# Patient Record
Sex: MALE | Race: WHITE | NOT HISPANIC OR LATINO | Employment: FULL TIME | URBAN - NONMETROPOLITAN AREA
[De-identification: names, ages, dates, MRNs, and addresses within clinical notes are randomized per-mention and may not be internally consistent; named-entity substitution may affect disease eponyms.]

---

## 2018-01-09 ENCOUNTER — OFFICE VISIT (OUTPATIENT)
Dept: INTERNAL MEDICINE | Facility: CLINIC | Age: 48
End: 2018-01-09

## 2018-01-09 VITALS
SYSTOLIC BLOOD PRESSURE: 120 MMHG | WEIGHT: 195 LBS | TEMPERATURE: 98.5 F | BODY MASS INDEX: 27.3 KG/M2 | HEIGHT: 71 IN | HEART RATE: 61 BPM | DIASTOLIC BLOOD PRESSURE: 64 MMHG | OXYGEN SATURATION: 98 %

## 2018-01-09 DIAGNOSIS — L21.9 SEBORRHEIC DERMATITIS: ICD-10-CM

## 2018-01-09 DIAGNOSIS — L05.01 PILONIDAL CYST WITH ABSCESS: Primary | ICD-10-CM

## 2018-01-09 PROCEDURE — 99214 OFFICE O/P EST MOD 30 MIN: CPT | Performed by: PHYSICIAN ASSISTANT

## 2018-01-09 RX ORDER — KETOCONAZOLE 20 MG/ML
SHAMPOO TOPICAL 2 TIMES WEEKLY
Qty: 120 ML | Refills: 5 | Status: SHIPPED | OUTPATIENT
Start: 2018-01-11 | End: 2018-08-23

## 2018-01-09 RX ORDER — DOXYCYCLINE HYCLATE 100 MG/1
100 CAPSULE ORAL EVERY 12 HOURS SCHEDULED
Qty: 14 CAPSULE | Refills: 0 | Status: SHIPPED | OUTPATIENT
Start: 2018-01-09 | End: 2018-01-16

## 2018-01-09 NOTE — PROGRESS NOTES
Alberto Torres is a 47 y.o. male.     Subjective   History of Present Illness   History of pilonidal cyst with reconstruction several years ago and currently has concern for a few days of worsening pain and mild swelling in the same area. He reports the pain is not bothersome when he is standing but very bothersome with sitting which makes driving hard. HE has not taken anything for the pain. He denies any drainage from the site. He soaked in warm water yesterday which helped a little.     Also concerned with a red, flaky patch of skin on the right side of his face.         The following portions of the patient's history were reviewed and updated as appropriate: allergies, current medications, past family history, past medical history, past social history, past surgical history and problem list.    Review of Systems    Constitutional: Negative for appetite change, chills, fatigue, fever and unexpected weight change.   HENT: Negative for congestion, ear pain, hearing loss, nosebleeds, postnasal drip, rhinorrhea, sore throat, tinnitus and trouble swallowing.    Eyes: Negative for photophobia, discharge and visual disturbance.   Respiratory: Negative for cough, chest tightness, shortness of breath and wheezing.    Cardiovascular: Negative for chest pain, palpitations and leg swelling.   Gastrointestinal: Negative for abdominal distention, abdominal pain, blood in stool, constipation, diarrhea, nausea and vomiting.   Endocrine: Negative for cold intolerance, heat intolerance, polydipsia, polyphagia and polyuria.   Musculoskeletal: Negative for arthralgias, back pain, joint swelling, myalgias, neck pain and neck stiffness.   Skin: pain in gluteal cleft. rash and color change on face. Negative for pallor and wound.   Allergic/Immunologic: Negative for environmental allergies, food allergies and immunocompromised state.   Neurological: Negative for dizziness, tremors, seizures, weakness, numbness and headaches.  "  Hematological: Negative for adenopathy. Does not bruise/bleed easily.   Psychiatric/Behavioral: Negative for sleep disturbances, agitation, behavioral problems, confusion, hallucinations, self-injury and suicidal ideas. The patient is not nervous/anxious.      Objective    Physical Exam  Constitutional: Oriented to person, place, and time. Appears well-developed and well-nourished.   HENT:   Head: Normocephalic and atraumatic.   Eyes: EOM are normal. Pupils are equal, round, and reactive to light.   Neck: Normal range of motion. Neck supple.   Cardiovascular: Normal rate, regular rhythm and normal heart sounds.    Pulmonary/Chest: Effort normal and breath sounds normal. No respiratory distress.  Has no wheezes or rales. Exhibits no chest wall tenderness.   Abdominal: Soft. Bowel sounds are normal. Exhibits no distension and no mass. There is no tenderness.   Musculoskeletal: Normal range of motion. Exhibits no tenderness.   Neurological: Alert and oriented to person, place, and time.   Skin: Mild erythema of the right and left side of proximal gluteal cleft with tenderness, no appreciable mass or abscess. Mild shiny erythematous patch right pre-auricular with flaking. Skin is warm and dry.   Psychiatric: Has a normal mood and affect. Behavior is normal. Judgment and thought content normal.       /64  Pulse 61  Temp 98.5 °F (36.9 °C)  Ht 180.3 cm (70.98\")  Wt 88.5 kg (195 lb)  SpO2 98%  BMI 27.21 kg/m2    Nursing note and vitals reviewed.          Assessment/Plan   Alberto was seen today for cyst on back.    Diagnoses and all orders for this visit:    Pilonidal cyst with abscess  -     doxycycline (VIBRAMYCIN) 100 MG capsule; Take 1 capsule by mouth Every 12 (Twelve) Hours for 7 days.  Soak twice daily in warm water with Epsom salt until resolved.       Seborrheic dermatitis  -     ketoconazole (NIZORAL) 2 % shampoo; Apply  topically 2 (Two) Times a Week. Lather hair and leave on for 5 minutes. Rinse " thoroughly.      Call or RTC if symptoms worsen or persist.

## 2018-08-23 ENCOUNTER — OFFICE VISIT (OUTPATIENT)
Dept: INTERNAL MEDICINE | Facility: CLINIC | Age: 48
End: 2018-08-23

## 2018-08-23 VITALS
BODY MASS INDEX: 27.27 KG/M2 | HEIGHT: 71 IN | DIASTOLIC BLOOD PRESSURE: 86 MMHG | WEIGHT: 194.8 LBS | HEART RATE: 60 BPM | OXYGEN SATURATION: 99 % | TEMPERATURE: 98.4 F | SYSTOLIC BLOOD PRESSURE: 140 MMHG

## 2018-08-23 DIAGNOSIS — R61 NIGHT SWEAT: ICD-10-CM

## 2018-08-23 DIAGNOSIS — H57.13 EYE PAIN, BILATERAL: ICD-10-CM

## 2018-08-23 DIAGNOSIS — R51.9 ACUTE INTRACTABLE HEADACHE, UNSPECIFIED HEADACHE TYPE: Primary | ICD-10-CM

## 2018-08-23 DIAGNOSIS — M25.50 ARTHRALGIA, UNSPECIFIED JOINT: ICD-10-CM

## 2018-08-23 DIAGNOSIS — R53.83 FATIGUE, UNSPECIFIED TYPE: ICD-10-CM

## 2018-08-23 PROCEDURE — 99213 OFFICE O/P EST LOW 20 MIN: CPT | Performed by: PHYSICIAN ASSISTANT

## 2018-08-23 RX ORDER — DOXYCYCLINE HYCLATE 100 MG/1
100 TABLET, DELAYED RELEASE ORAL 2 TIMES DAILY
Qty: 20 TABLET | Refills: 0 | Status: SHIPPED | OUTPATIENT
Start: 2018-08-23 | End: 2018-09-02

## 2018-08-23 NOTE — PROGRESS NOTES
Alberto Torres is a 48 y.o. male.     Subjective   History of Present Illness   Here today with concern of acute headache behind the eyes and in the base of his head for the last 4 days, worsening since onset.  Has pain with moving his eyes and he feels his vision is a little fuzzy.  He has also had intermittent low-grade fever, night sweats, fatigue and arthralgias onset of headache. Tylenol and ibuprofen do not help at all.   He denies any rash, weakness, edema, chest pain, SOA, nausea or sore throat.  No known tick bite but he did find a small lone-star tick crawling on him a few days prior to onset of symptoms. Of note, his nephew was diagnosed with Lyme disease 3 weeks ago and they are often in the woods together.           The following portions of the patient's history were reviewed and updated as appropriate: allergies, current medications, past family history, past medical history, past social history, past surgical history and problem list.    Review of Systems    Constitutional: fatigue, fever. Negative for appetite change, chills and unexpected weight change.   HENT: Negative for congestion, ear pain, hearing loss, nosebleeds, postnasal drip, rhinorrhea, sore throat, tinnitus and trouble swallowing.    Eyes: visual disturbance. Negative for photophobia, discharge.  Respiratory: Negative for cough, chest tightness, shortness of breath and wheezing.    Cardiovascular: Negative for chest pain, palpitations and leg swelling.   Gastrointestinal: Negative for abdominal distention, abdominal pain, blood in stool, constipation, diarrhea, nausea and vomiting.   Endocrine: Negative for cold intolerance, heat intolerance, polydipsia, polyphagia and polyuria.   Musculoskeletal: arthralgias.  Negative for back pain, joint swelling, myalgias, neck pain and neck stiffness.   Skin: Negative for color change, pallor, rash and wound.   Allergic/Immunologic: Negative for environmental allergies, food allergies and  "immunocompromised state.   Neurological: headache. Negative for dizziness, tremors, seizures, weakness, numbness.  Hematological: Negative for adenopathy. Does not bruise/bleed easily.   Psychiatric/Behavioral: Negative for sleep disturbances, agitation, behavioral problems, confusion, hallucinations, self-injury and suicidal ideas. The patient is not nervous/anxious.      Objective    Physical Exam  Constitutional: appears fatigued. Appears well-developed and well-nourished.   HENT: OP normal. TMs normal. Nares normal.   Head: No sinus tenderness.  Normocephalic and atraumatic.   Eyes: EOM are normal. Pupils are equal, round, and reactive to light.   Neck: Normal range of motion. Neck supple.   Cardiovascular: Normal rate, regular rhythm and normal heart sounds.    Pulmonary/Chest: Effort normal and breath sounds normal. No respiratory distress.  Has no wheezes or rales. Exhibits no chest wall tenderness.   Abdominal: Soft. Bowel sounds are normal. Exhibits no distension and no mass. There is no tenderness.   Musculoskeletal: Normal range of motion. Exhibits no tenderness.   Neurological: CN II-XII intact. Alert and oriented to person, place, and time.   Skin: Skin is warm and dry.   Psychiatric: Has a normal mood and affect. Behavior is normal. Judgment and thought content normal.       /86   Pulse 60   Temp 98.4 °F (36.9 °C)   Ht 180.3 cm (70.98\")   Wt 88.4 kg (194 lb 12.8 oz)   SpO2 99%   BMI 27.18 kg/m²     Nursing note and vitals reviewed.        Assessment/Plan   Alberto was seen today for headache and neck pain.    Diagnoses and all orders for this visit:    Acute intractable headache, unspecified headache type  -     Lyme, Total Antibody Test / Reflex  -     CBC & Differential  -     Comprehensive Metabolic Panel  -     Sedimentation Rate  -     doxycycline (DORYX) 100 MG enteric coated tablet; Take 1 tablet by mouth 2 (Two) Times a Day for 10 days.    Eye pain, bilateral  -     Lyme, Total " Antibody Test / Reflex  -     CBC & Differential  -     Comprehensive Metabolic Panel  -     Sedimentation Rate    Arthralgia, unspecified joint  -     Lyme, Total Antibody Test / Reflex  -     CBC & Differential  -     Comprehensive Metabolic Panel  -     Sedimentation Rate  -     doxycycline (DORYX) 100 MG enteric coated tablet; Take 1 tablet by mouth 2 (Two) Times a Day for 10 days.    Fatigue, unspecified type  -     Lyme, Total Antibody Test / Reflex  -     CBC & Differential  -     Comprehensive Metabolic Panel  -     Sedimentation Rate  -     doxycycline (DORYX) 100 MG enteric coated tablet; Take 1 tablet by mouth 2 (Two) Times a Day for 10 days.    Night sweat  -     Lyme, Total Antibody Test / Reflex  -     CBC & Differential  -     Comprehensive Metabolic Panel  -     Sedimentation Rate  -     doxycycline (DORYX) 100 MG enteric coated tablet; Take 1 tablet by mouth 2 (Two) Times a Day for 10 days.        Treat for suspected Lyme disease.

## 2018-08-24 LAB
ALBUMIN SERPL-MCNC: 4.2 G/DL (ref 3.5–5)
ALBUMIN/GLOB SERPL: 1.6 G/DL (ref 1–2)
ALP SERPL-CCNC: 62 U/L (ref 38–126)
ALT SERPL-CCNC: 54 U/L (ref 13–69)
AST SERPL-CCNC: 68 U/L (ref 15–46)
B BURGDOR IGG+IGM SER-ACNC: <0.91 ISR (ref 0–0.9)
BASOPHILS # BLD MANUAL: 0.05 10*3/MM3 (ref 0–0.2)
BASOPHILS NFR BLD MANUAL: 1 % (ref 0–2.5)
BILIRUB SERPL-MCNC: 1 MG/DL (ref 0.2–1.3)
BUN SERPL-MCNC: 15 MG/DL (ref 7–20)
BUN/CREAT SERPL: 12.5 (ref 6.3–21.9)
CALCIUM SERPL-MCNC: 9.5 MG/DL (ref 8.4–10.2)
CHLORIDE SERPL-SCNC: 103 MMOL/L (ref 98–107)
CO2 SERPL-SCNC: 28 MMOL/L (ref 26–30)
CREAT SERPL-MCNC: 1.2 MG/DL (ref 0.6–1.3)
DIFFERENTIAL COMMENT: NORMAL
EOSINOPHIL # BLD MANUAL: 0.18 10*3/MM3 (ref 0–0.7)
EOSINOPHIL NFR BLD MANUAL: 4 % (ref 0–7)
ERYTHROCYTE [DISTWIDTH] IN BLOOD BY AUTOMATED COUNT: 12 % (ref 11.5–14.5)
ERYTHROCYTE [SEDIMENTATION RATE] IN BLOOD BY WESTERGREN METHOD: 1 MM/HR (ref 0–15)
GLOBULIN SER CALC-MCNC: 2.7 GM/DL
GLUCOSE SERPL-MCNC: 91 MG/DL (ref 74–98)
HCT VFR BLD AUTO: 47 % (ref 42–52)
HGB BLD-MCNC: 15.9 G/DL (ref 14–18)
LYMPHOCYTES # BLD MANUAL: 1.38 10*3/MM3 (ref 0.6–3.4)
LYMPHOCYTES NFR BLD MANUAL: 30 % (ref 10–50)
MCH RBC QN AUTO: 32.1 PG (ref 27–31)
MCHC RBC AUTO-ENTMCNC: 33.8 G/DL (ref 30–37)
MCV RBC AUTO: 94.8 FL (ref 80–94)
MONOCYTES # BLD MANUAL: 0.28 10*3/MM3 (ref 0–0.9)
MONOCYTES NFR BLD MANUAL: 6 % (ref 0–12)
NEUTROPHILS # BLD MANUAL: 2.21 10*3/MM3 (ref 2–6.9)
NEUTROPHILS NFR BLD MANUAL: 40 % (ref 37–80)
PLATELET # BLD AUTO: 145 10*3/MM3 (ref 130–400)
PLATELET BLD QL SMEAR: NORMAL
POTASSIUM SERPL-SCNC: 4.7 MMOL/L (ref 3.5–5.1)
PROT SERPL-MCNC: 6.9 G/DL (ref 6.3–8.2)
RBC # BLD AUTO: 4.96 10*6/MM3 (ref 4.7–6.1)
RBC MORPH BLD: NORMAL
SODIUM SERPL-SCNC: 138 MMOL/L (ref 137–145)
WBC # BLD AUTO: 4.6 10*3/MM3 (ref 4.8–10.8)

## 2019-01-04 ENCOUNTER — OFFICE VISIT (OUTPATIENT)
Dept: INTERNAL MEDICINE | Facility: CLINIC | Age: 49
End: 2019-01-04

## 2019-01-04 VITALS
OXYGEN SATURATION: 97 % | DIASTOLIC BLOOD PRESSURE: 83 MMHG | SYSTOLIC BLOOD PRESSURE: 146 MMHG | BODY MASS INDEX: 26.32 KG/M2 | HEIGHT: 71 IN | TEMPERATURE: 98.1 F | WEIGHT: 188 LBS | HEART RATE: 59 BPM

## 2019-01-04 DIAGNOSIS — J06.9 ACUTE URI: Primary | ICD-10-CM

## 2019-01-04 PROCEDURE — 99213 OFFICE O/P EST LOW 20 MIN: CPT | Performed by: PHYSICIAN ASSISTANT

## 2019-01-04 RX ORDER — AZITHROMYCIN 250 MG/1
TABLET, FILM COATED ORAL
Qty: 6 TABLET | Refills: 0 | Status: SHIPPED | OUTPATIENT
Start: 2019-01-04 | End: 2021-09-11 | Stop reason: HOSPADM

## 2019-01-04 NOTE — PROGRESS NOTES
Subjective     Chief Complaint: cough, sinus pressure    History of Present Illness     Alberto Torres is a 48 y.o. male presenting with complaints of fatigue, body aches, headache, sinus pressure, ear pain, cough, congestion x over 2 weeks. Not improving with dayquil, nyquil, mucinex, zyrtec. Felt feverish last night. Denies sick contacts. Denies CP, SOA, wheezing, nausea, vomiting, diarrhea.    The following portions of the patient's history were reviewed and updated as appropriate: current medications, allergies, PMH.    Review of Systems   Constitutional: Positive for fatigue. Negative for appetite change, chills, fever and unexpected weight change.   HENT: Positive for congestion, ear pain, sinus pressure and sore throat. Negative for hearing loss, nosebleeds, tinnitus and trouble swallowing.    Eyes: Negative for photophobia, discharge and visual disturbance.   Respiratory: Positive for cough. Negative for chest tightness, shortness of breath and wheezing.    Cardiovascular: Negative for chest pain, palpitations and leg swelling.   Gastrointestinal: Negative for abdominal distention, abdominal pain, blood in stool, constipation, diarrhea, nausea and vomiting.   Endocrine: Negative for cold intolerance, heat intolerance, polydipsia, polyphagia and polyuria.   Genitourinary: Negative for difficulty urinating, dysuria, flank pain, frequency, hematuria and urgency.   Musculoskeletal: Negative for arthralgias, back pain, joint swelling, myalgias, neck pain and neck stiffness.   Skin: Negative for color change, pallor, rash and wound.   Allergic/Immunologic: Negative for environmental allergies, food allergies and immunocompromised state.   Neurological: Positive for headaches. Negative for dizziness, weakness and numbness.   Hematological: Negative for adenopathy. Does not bruise/bleed easily.   Psychiatric/Behavioral: Negative for dysphoric mood, hallucinations, sleep disturbance and suicidal ideas. The patient  "is not nervous/anxious.        Objective     Vitals:    01/04/19 1347   BP: 146/83   Pulse: 59   Temp: 98.1 °F (36.7 °C)   SpO2: 97%   Weight: 85.3 kg (188 lb)   Height: 180.3 cm (70.98\")       Physical Exam   Constitutional: Appears well-developed and well-nourished.   Ears: TMs dull bilaterally  Nose: Nose normal.   Mouth/Throat: OP erythema, edema  Eyes: EOM are normal. Pupils are equal, round, and reactive to light.   Neck: Normal range of motion. Neck supple.   Cardiovascular: Normal rate, regular rhythm and normal heart sounds.    Pulmonary/Chest: Effort normal and breath sounds normal.   Lymphadenopathy: Mild cervical adenopathy noted.   Neurological: Alert and oriented to person, place, and time.   Skin: Skin is warm and dry.   Psychiatric: Exhibits a normal mood and affect.     Assessment/Plan     Diagnoses and all orders for this visit:    Acute URI  -     azithromycin (ZITHROMAX) 250 MG tablet; Take 2 tablets the first day, then 1 tablet daily for the remaining 4 days.    Increase water intake, tylenol and/or motrin as needed, mucinex DM    Mi Duval PA-C  01/04/2019         Please note that portions of this note were completed with a voice recognition program. Efforts were made to edit dictation, but occasionally words are mistranscribed.  "

## 2021-08-31 ENCOUNTER — HOSPITAL ENCOUNTER (INPATIENT)
Facility: HOSPITAL | Age: 51
LOS: 11 days | Discharge: HOME | End: 2021-09-11
Attending: EMERGENCY MEDICINE | Admitting: EMERGENCY MEDICINE

## 2021-08-31 ENCOUNTER — APPOINTMENT (OUTPATIENT)
Dept: CT IMAGING | Facility: HOSPITAL | Age: 51
End: 2021-08-31

## 2021-08-31 ENCOUNTER — APPOINTMENT (OUTPATIENT)
Dept: GENERAL RADIOLOGY | Facility: HOSPITAL | Age: 51
End: 2021-08-31

## 2021-08-31 DIAGNOSIS — J12.82 PNEUMONIA DUE TO COVID-19 VIRUS: ICD-10-CM

## 2021-08-31 DIAGNOSIS — U07.1 COVID-19: Primary | ICD-10-CM

## 2021-08-31 DIAGNOSIS — J96.01 ACUTE RESPIRATORY FAILURE WITH HYPOXIA (HCC): ICD-10-CM

## 2021-08-31 DIAGNOSIS — U07.1 PNEUMONIA DUE TO COVID-19 VIRUS: ICD-10-CM

## 2021-08-31 LAB
ALBUMIN SERPL-MCNC: 3.7 G/DL (ref 3.5–5.2)
ALBUMIN/GLOB SERPL: 1.2 G/DL
ALP SERPL-CCNC: 72 U/L (ref 39–117)
ALT SERPL W P-5'-P-CCNC: 54 U/L (ref 1–41)
ANION GAP SERPL CALCULATED.3IONS-SCNC: 14.8 MMOL/L (ref 5–15)
AST SERPL-CCNC: 100 U/L (ref 1–40)
BASOPHILS # BLD AUTO: 0.01 10*3/MM3 (ref 0–0.2)
BASOPHILS NFR BLD AUTO: 0.1 % (ref 0–1.5)
BILIRUB SERPL-MCNC: 0.5 MG/DL (ref 0–1.2)
BUN SERPL-MCNC: 19 MG/DL (ref 6–20)
BUN/CREAT SERPL: 16.7 (ref 7–25)
CALCIUM SPEC-SCNC: 9 MG/DL (ref 8.6–10.5)
CHLORIDE SERPL-SCNC: 100 MMOL/L (ref 98–107)
CO2 SERPL-SCNC: 25.2 MMOL/L (ref 22–29)
CREAT SERPL-MCNC: 1.14 MG/DL (ref 0.76–1.27)
CRP SERPL-MCNC: 4.01 MG/DL (ref 0–0.5)
D DIMER PPP FEU-MCNC: 1.63 MCGFEU/ML (ref 0–0.57)
D-LACTATE SERPL-SCNC: 1.6 MMOL/L (ref 0.5–2)
D-LACTATE SERPL-SCNC: 2.9 MMOL/L (ref 0.5–2)
DEPRECATED RDW RBC AUTO: 40.8 FL (ref 37–54)
EOSINOPHIL # BLD AUTO: 0 10*3/MM3 (ref 0–0.4)
EOSINOPHIL NFR BLD AUTO: 0 % (ref 0.3–6.2)
ERYTHROCYTE [DISTWIDTH] IN BLOOD BY AUTOMATED COUNT: 11.9 % (ref 12.3–15.4)
GFR SERPL CREATININE-BSD FRML MDRD: 68 ML/MIN/1.73
GLOBULIN UR ELPH-MCNC: 3.1 GM/DL
GLUCOSE SERPL-MCNC: 133 MG/DL (ref 65–99)
HCT VFR BLD AUTO: 46.3 % (ref 37.5–51)
HGB BLD-MCNC: 16 G/DL (ref 13–17.7)
HOLD SPECIMEN: NORMAL
HOLD SPECIMEN: NORMAL
IMM GRANULOCYTES # BLD AUTO: 0.06 10*3/MM3 (ref 0–0.05)
IMM GRANULOCYTES NFR BLD AUTO: 0.7 % (ref 0–0.5)
LYMPHOCYTES # BLD AUTO: 0.76 10*3/MM3 (ref 0.7–3.1)
LYMPHOCYTES NFR BLD AUTO: 8.6 % (ref 19.6–45.3)
MCH RBC QN AUTO: 32.1 PG (ref 26.6–33)
MCHC RBC AUTO-ENTMCNC: 34.6 G/DL (ref 31.5–35.7)
MCV RBC AUTO: 93 FL (ref 79–97)
MONOCYTES # BLD AUTO: 0.72 10*3/MM3 (ref 0.1–0.9)
MONOCYTES NFR BLD AUTO: 8.2 % (ref 5–12)
NEUTROPHILS NFR BLD AUTO: 7.28 10*3/MM3 (ref 1.7–7)
NEUTROPHILS NFR BLD AUTO: 82.4 % (ref 42.7–76)
NRBC BLD AUTO-RTO: 0 /100 WBC (ref 0–0.2)
NT-PROBNP SERPL-MCNC: 223.2 PG/ML (ref 0–900)
PLATELET # BLD AUTO: 268 10*3/MM3 (ref 140–450)
PMV BLD AUTO: 11 FL (ref 6–12)
POTASSIUM SERPL-SCNC: 4.4 MMOL/L (ref 3.5–5.2)
PROCALCITONIN SERPL-MCNC: 0.11 NG/ML (ref 0–0.25)
PROT SERPL-MCNC: 6.8 G/DL (ref 6–8.5)
RBC # BLD AUTO: 4.98 10*6/MM3 (ref 4.14–5.8)
SODIUM SERPL-SCNC: 140 MMOL/L (ref 136–145)
TROPONIN T SERPL-MCNC: <0.01 NG/ML (ref 0–0.03)
WBC # BLD AUTO: 8.83 10*3/MM3 (ref 3.4–10.8)
WHOLE BLOOD HOLD SPECIMEN: NORMAL
WHOLE BLOOD HOLD SPECIMEN: NORMAL

## 2021-08-31 PROCEDURE — 99222 1ST HOSP IP/OBS MODERATE 55: CPT | Performed by: STUDENT IN AN ORGANIZED HEALTH CARE EDUCATION/TRAINING PROGRAM

## 2021-08-31 PROCEDURE — 71275 CT ANGIOGRAPHY CHEST: CPT

## 2021-08-31 PROCEDURE — 94799 UNLISTED PULMONARY SVC/PX: CPT

## 2021-08-31 PROCEDURE — 80053 COMPREHEN METABOLIC PANEL: CPT

## 2021-08-31 PROCEDURE — 84145 PROCALCITONIN (PCT): CPT | Performed by: EMERGENCY MEDICINE

## 2021-08-31 PROCEDURE — 84484 ASSAY OF TROPONIN QUANT: CPT

## 2021-08-31 PROCEDURE — 99284 EMERGENCY DEPT VISIT MOD MDM: CPT

## 2021-08-31 PROCEDURE — 93005 ELECTROCARDIOGRAM TRACING: CPT

## 2021-08-31 PROCEDURE — 87040 BLOOD CULTURE FOR BACTERIA: CPT | Performed by: EMERGENCY MEDICINE

## 2021-08-31 PROCEDURE — 83880 ASSAY OF NATRIURETIC PEPTIDE: CPT

## 2021-08-31 PROCEDURE — 25010000002 AZITHROMYCIN 500 MG/250 ML: Performed by: EMERGENCY MEDICINE

## 2021-08-31 PROCEDURE — 83605 ASSAY OF LACTIC ACID: CPT | Performed by: EMERGENCY MEDICINE

## 2021-08-31 PROCEDURE — 25010000002 IOPAMIDOL 61 % SOLUTION: Performed by: FAMILY MEDICINE

## 2021-08-31 PROCEDURE — 85025 COMPLETE CBC W/AUTO DIFF WBC: CPT

## 2021-08-31 PROCEDURE — 71045 X-RAY EXAM CHEST 1 VIEW: CPT

## 2021-08-31 PROCEDURE — 25010000002 DEXAMETHASONE SODIUM PHOSPHATE 10 MG/ML SOLUTION: Performed by: EMERGENCY MEDICINE

## 2021-08-31 PROCEDURE — 86140 C-REACTIVE PROTEIN: CPT | Performed by: EMERGENCY MEDICINE

## 2021-08-31 PROCEDURE — 25010000002 ENOXAPARIN PER 10 MG: Performed by: STUDENT IN AN ORGANIZED HEALTH CARE EDUCATION/TRAINING PROGRAM

## 2021-08-31 PROCEDURE — 85379 FIBRIN DEGRADATION QUANT: CPT | Performed by: EMERGENCY MEDICINE

## 2021-08-31 RX ORDER — SODIUM CHLORIDE 0.9 % (FLUSH) 0.9 %
10 SYRINGE (ML) INJECTION EVERY 12 HOURS SCHEDULED
Status: DISCONTINUED | OUTPATIENT
Start: 2021-08-31 | End: 2021-09-11 | Stop reason: HOSPADM

## 2021-08-31 RX ORDER — SODIUM CHLORIDE 0.9 % (FLUSH) 0.9 %
10 SYRINGE (ML) INJECTION AS NEEDED
Status: DISCONTINUED | OUTPATIENT
Start: 2021-08-31 | End: 2021-09-11 | Stop reason: HOSPADM

## 2021-08-31 RX ORDER — ACETAMINOPHEN 160 MG/5ML
650 SOLUTION ORAL EVERY 4 HOURS PRN
Status: DISCONTINUED | OUTPATIENT
Start: 2021-08-31 | End: 2021-09-11 | Stop reason: HOSPADM

## 2021-08-31 RX ORDER — DEXAMETHASONE SODIUM PHOSPHATE 4 MG/ML
6 INJECTION, SOLUTION INTRA-ARTICULAR; INTRALESIONAL; INTRAMUSCULAR; INTRAVENOUS; SOFT TISSUE DAILY
Status: CANCELLED | OUTPATIENT
Start: 2021-08-31 | End: 2021-09-10

## 2021-08-31 RX ORDER — DEXAMETHASONE SODIUM PHOSPHATE 10 MG/ML
10 INJECTION INTRAMUSCULAR; INTRAVENOUS EVERY 12 HOURS
Status: DISCONTINUED | OUTPATIENT
Start: 2021-08-31 | End: 2021-09-06

## 2021-08-31 RX ORDER — ACETAMINOPHEN 325 MG/1
650 TABLET ORAL EVERY 4 HOURS PRN
Status: DISCONTINUED | OUTPATIENT
Start: 2021-08-31 | End: 2021-09-11 | Stop reason: HOSPADM

## 2021-08-31 RX ORDER — ONDANSETRON 2 MG/ML
4 INJECTION INTRAMUSCULAR; INTRAVENOUS EVERY 6 HOURS PRN
Status: DISCONTINUED | OUTPATIENT
Start: 2021-08-31 | End: 2021-09-11 | Stop reason: HOSPADM

## 2021-08-31 RX ORDER — ALBUTEROL SULFATE 90 UG/1
6 AEROSOL, METERED RESPIRATORY (INHALATION) ONCE
Status: COMPLETED | OUTPATIENT
Start: 2021-08-31 | End: 2021-08-31

## 2021-08-31 RX ORDER — DEXAMETHASONE SODIUM PHOSPHATE 10 MG/ML
10 INJECTION, SOLUTION INTRAMUSCULAR; INTRAVENOUS ONCE
Status: COMPLETED | OUTPATIENT
Start: 2021-08-31 | End: 2021-08-31

## 2021-08-31 RX ORDER — ALBUTEROL SULFATE 90 UG/1
2 AEROSOL, METERED RESPIRATORY (INHALATION) EVERY 6 HOURS PRN
Status: DISCONTINUED | OUTPATIENT
Start: 2021-08-31 | End: 2021-09-11 | Stop reason: HOSPADM

## 2021-08-31 RX ORDER — ACETAMINOPHEN 650 MG/1
650 SUPPOSITORY RECTAL EVERY 4 HOURS PRN
Status: DISCONTINUED | OUTPATIENT
Start: 2021-08-31 | End: 2021-09-11 | Stop reason: HOSPADM

## 2021-08-31 RX ORDER — GUAIFENESIN/DEXTROMETHORPHAN 100-10MG/5
15 SYRUP ORAL EVERY 4 HOURS PRN
Status: DISCONTINUED | OUTPATIENT
Start: 2021-08-31 | End: 2021-09-11 | Stop reason: HOSPADM

## 2021-08-31 RX ADMIN — ENOXAPARIN SODIUM 40 MG: 40 INJECTION SUBCUTANEOUS at 19:40

## 2021-08-31 RX ADMIN — SODIUM CHLORIDE, PRESERVATIVE FREE 10 ML: 5 INJECTION INTRAVENOUS at 19:40

## 2021-08-31 RX ADMIN — IOPAMIDOL 100 ML: 612 INJECTION, SOLUTION INTRAVENOUS at 21:45

## 2021-08-31 RX ADMIN — DEXAMETHASONE SODIUM PHOSPHATE 10 MG: 10 INJECTION, SOLUTION INTRAMUSCULAR; INTRAVENOUS at 13:41

## 2021-08-31 RX ADMIN — SODIUM CHLORIDE 1000 ML: 9 INJECTION, SOLUTION INTRAVENOUS at 15:18

## 2021-08-31 RX ADMIN — ALBUTEROL SULFATE 6 PUFF: 90 AEROSOL, METERED RESPIRATORY (INHALATION) at 13:42

## 2021-08-31 RX ADMIN — AZITHROMYCIN 500 MG: 500 INJECTION, POWDER, LYOPHILIZED, FOR SOLUTION INTRAVENOUS at 14:40

## 2021-09-01 LAB
ANION GAP SERPL CALCULATED.3IONS-SCNC: 10.6 MMOL/L (ref 5–15)
BUN SERPL-MCNC: 18 MG/DL (ref 6–20)
BUN/CREAT SERPL: 22.8 (ref 7–25)
CALCIUM SPEC-SCNC: 8.3 MG/DL (ref 8.6–10.5)
CHLORIDE SERPL-SCNC: 103 MMOL/L (ref 98–107)
CO2 SERPL-SCNC: 23.4 MMOL/L (ref 22–29)
CREAT SERPL-MCNC: 0.79 MG/DL (ref 0.76–1.27)
DACRYOCYTES BLD QL SMEAR: NORMAL
DEPRECATED RDW RBC AUTO: 40.6 FL (ref 37–54)
ERYTHROCYTE [DISTWIDTH] IN BLOOD BY AUTOMATED COUNT: 11.9 % (ref 12.3–15.4)
GFR SERPL CREATININE-BSD FRML MDRD: 104 ML/MIN/1.73
GLUCOSE SERPL-MCNC: 149 MG/DL (ref 65–99)
HCT VFR BLD AUTO: 43 % (ref 37.5–51)
HGB BLD-MCNC: 14.8 G/DL (ref 13–17.7)
LYMPHOCYTES # BLD MANUAL: 1.62 10*3/MM3 (ref 0.7–3.1)
LYMPHOCYTES NFR BLD MANUAL: 18 % (ref 19.6–45.3)
LYMPHOCYTES NFR BLD MANUAL: 7 % (ref 5–12)
MCH RBC QN AUTO: 32.1 PG (ref 26.6–33)
MCHC RBC AUTO-ENTMCNC: 34.4 G/DL (ref 31.5–35.7)
MCV RBC AUTO: 93.3 FL (ref 79–97)
METAMYELOCYTES NFR BLD MANUAL: 1 % (ref 0–0)
MONOCYTES # BLD AUTO: 0.52 10*3/MM3 (ref 0.1–0.9)
NEUTROPHILS # BLD AUTO: 5.15 10*3/MM3 (ref 1.7–7)
NEUTROPHILS NFR BLD MANUAL: 67 % (ref 42.7–76)
NEUTS BAND NFR BLD MANUAL: 3 % (ref 0–5)
OVALOCYTES BLD QL SMEAR: NORMAL
PLATELET # BLD AUTO: 278 10*3/MM3 (ref 140–450)
PMV BLD AUTO: 11.2 FL (ref 6–12)
POTASSIUM SERPL-SCNC: 4.5 MMOL/L (ref 3.5–5.2)
PROCALCITONIN SERPL-MCNC: 0.1 NG/ML (ref 0–0.25)
RBC # BLD AUTO: 4.61 10*6/MM3 (ref 4.14–5.8)
RBC MORPH BLD: NORMAL
SCAN SLIDE: NORMAL
SMALL PLATELETS BLD QL SMEAR: ADEQUATE
SMALL PLATELETS BLD QL SMEAR: ADEQUATE
SODIUM SERPL-SCNC: 137 MMOL/L (ref 136–145)
VARIANT LYMPHS NFR BLD MANUAL: 4 % (ref 0–5)
WBC # BLD AUTO: 7.36 10*3/MM3 (ref 3.4–10.8)
WBC MORPH BLD: NORMAL

## 2021-09-01 PROCEDURE — 25010000002 ENOXAPARIN PER 10 MG: Performed by: STUDENT IN AN ORGANIZED HEALTH CARE EDUCATION/TRAINING PROGRAM

## 2021-09-01 PROCEDURE — 80048 BASIC METABOLIC PNL TOTAL CA: CPT | Performed by: STUDENT IN AN ORGANIZED HEALTH CARE EDUCATION/TRAINING PROGRAM

## 2021-09-01 PROCEDURE — 99231 SBSQ HOSP IP/OBS SF/LOW 25: CPT | Performed by: STUDENT IN AN ORGANIZED HEALTH CARE EDUCATION/TRAINING PROGRAM

## 2021-09-01 PROCEDURE — 94799 UNLISTED PULMONARY SVC/PX: CPT

## 2021-09-01 PROCEDURE — 25010000002 DEXAMETHASONE PER 1 MG: Performed by: FAMILY MEDICINE

## 2021-09-01 PROCEDURE — 85025 COMPLETE CBC W/AUTO DIFF WBC: CPT | Performed by: STUDENT IN AN ORGANIZED HEALTH CARE EDUCATION/TRAINING PROGRAM

## 2021-09-01 PROCEDURE — 84145 PROCALCITONIN (PCT): CPT | Performed by: STUDENT IN AN ORGANIZED HEALTH CARE EDUCATION/TRAINING PROGRAM

## 2021-09-01 RX ORDER — PROMETHAZINE HYDROCHLORIDE 6.25 MG/5ML
6.25 SYRUP ORAL 3 TIMES DAILY
COMMUNITY
End: 2021-09-16

## 2021-09-01 RX ORDER — AMINO ACIDS/PROTEIN HYDROLYS 15G-100/30
30 LIQUID (ML) ORAL 2 TIMES DAILY
Status: DISCONTINUED | OUTPATIENT
Start: 2021-09-01 | End: 2021-09-11 | Stop reason: HOSPADM

## 2021-09-01 RX ORDER — DEXAMETHASONE 0.5 MG/1
2 TABLET ORAL 2 TIMES DAILY WITH MEALS
COMMUNITY
End: 2021-09-11 | Stop reason: HOSPADM

## 2021-09-01 RX ADMIN — SODIUM CHLORIDE, PRESERVATIVE FREE 10 ML: 5 INJECTION INTRAVENOUS at 02:45

## 2021-09-01 RX ADMIN — SODIUM CHLORIDE, PRESERVATIVE FREE 10 ML: 5 INJECTION INTRAVENOUS at 10:20

## 2021-09-01 RX ADMIN — Medication 30 ML: at 10:19

## 2021-09-01 RX ADMIN — DEXAMETHASONE SODIUM PHOSPHATE 10 MG: 10 INJECTION INTRAMUSCULAR; INTRAVENOUS at 14:52

## 2021-09-01 RX ADMIN — SODIUM CHLORIDE, PRESERVATIVE FREE 10 ML: 5 INJECTION INTRAVENOUS at 21:41

## 2021-09-01 RX ADMIN — DEXAMETHASONE SODIUM PHOSPHATE 10 MG: 10 INJECTION INTRAMUSCULAR; INTRAVENOUS at 02:45

## 2021-09-01 RX ADMIN — Medication 30 ML: at 21:41

## 2021-09-01 RX ADMIN — ENOXAPARIN SODIUM 40 MG: 40 INJECTION SUBCUTANEOUS at 21:41

## 2021-09-01 NOTE — PLAN OF CARE
Goal Outcome Evaluation:  Plan of Care Reviewed With: patient        Progress: no change  Outcome Summary: pleasant patient with no complaint of pain at this time-medications given per Dr. Lyles's orders-patient very SOA with Aivo high flow nasal cannula 70%/30L-monitor labs and continue to monitor patient-encouragement given to use IS often and stay out of the bed as much as possible-encouraged to stay in bedside recliner as much as possible and move around in the room as much as possible-will continue to monitor patient's activity

## 2021-09-01 NOTE — PAYOR COMM NOTE
"TO:BC  FROM: YVONNE CRUMP, RN PHONE [phone]  IN CLINICALS REF# [id number]    Negro De La Fuente (51 y.o. Male)     Date of Birth Social Security Number Address Home Phone MRN    1970  [address] [place] [address] [phone] [id number]    Ep[employer]op[employer] Marital Status          None        Admission Date Admission Type Admitting Provider Attending Provider Department, Room/Bed    21 Emergency Hue Villalobos DO Clayton, Taylor C, DO [facility] MED SURG  3, /    Discharge Date Discharge Disposition Discharge Destination                       Attending Provider: Hue Villalobos DO    Allergies: No Known Allergies    Isolation: Enh Drop/Con   Infection: COVID (confirmed) (21)   Code Status: CPR    Ht: 185.4 cm (73\")   Wt: 81.9 kg (180 lb 8.9 oz)    Admission Cmt: None   Principal Problem: None                Active Insurance as of 2021     Primary Coverage     Payor Plan Insurance Group Employer/Plan Group    [employer]HE[employer] BLUE CROSS AN[employer]M BLUE CROSS BLUE SHIELD PPO [id number]     Payor Plan Address Payor Plan Phone Number Payor Plan Fax Number Effective Dates    [phone]  2008 - None Entered    [place] [address]       Subscriber Name Subscriber Birth Date Member ID       NEGRO DE LA FUENTE 1970 [id number]                 Emergency Contacts      (Rel.) Home Phone Work Phone Mobile Phone    AMERICA SANDOVAL (Sister) [phone] -- --               History & Physical      Hue Villalobos DO at 21 1548              [facility] HOSPITALIST   HISTORY AND PHYSICAL      Name:  Negro De La Fuente   Age:  51 y.o.  Sex:  male  :  1970  MRN:  [id number]   Visit Number:  [id number]  Admission Date:  2021  Date Of Service:  21  Primary Care Physician:  Yajaira Saleh APRN    Chief Complaint:     Shortness of breath    History Of Presenting Illness:      Mr. De La Fuente is a " "51-year-old male with no significant past medical history that presents to the hospital with complaints of worsening shortness of breath. He states that beginning 11 days ago, he initially felt very fatigued with some shortness of breath. This worsened over the next few days, prompting him to be tested for COVID on 8/23/21 at which time he tested positive. He was told to stay home, rest, and hydrate as much as possible. He continued to have worsening shortness of breath, dysgeusia, anosmia, and developed a headache which prompted him to be seen at an outside hospital where he was told he had \"double pneumonia.\" He was given Azithromycin and discharged home. He has been feeling progressively more dyspneic, with intermittent coughing, today nauseous with vomiting, prompting him to be seen in the ED. He is not vaccinated.    Upon arrival to the ED, patient was found to be hypoxic with an oxygen saturation of 80% on 10L nasal cannula. This then improved to 98% with placing the patient on 30L high flow nasal cannula, 100% FiO2. He was given Albuterol as well as Decadron in the ED and subsequently admitted to the hospital for further evaluation and treatment.    Review Of Systems:    All systems were reviewed and negative except as mentioned in history of presenting illness, assessment and plan.    Past Medical History: Patient  has no past medical history on file.    Past Surgical History: Patient  has no past surgical history on file.    Social History: Patient  reports that he has never smoked. He does not have any smokeless tobacco history on file. He reports current alcohol use.    Family History: Patient's family history is not on file.    Allergies:      Patient has no known allergies.    Home Medications:    Prior to Admission Medications     Prescriptions Last Dose Informant Patient Reported? Taking?    azithromycin (ZITHROMAX) 250 MG tablet   No No    Take 2 tablets the first day, then 1 tablet daily for the " remaining 4 days.    ibuprofen (ADVIL,MOTRIN) 200 MG tablet   Yes No    Take 200 mg by mouth every 6 (six) hours as needed for mild pain (1-3).    mupirocin (BACTROBAN) 2 % ointment   Yes No    Apply 1 application topically to the appropriate area as directed As Needed.    Omega-3 Fatty Acids (FISH OIL OMEGA-3 PO)   Yes No    Take  by mouth.        ED Medications:    Medications   sodium chloride 0.9 % flush 10 mL (has no administration in time range)   dexamethasone sodium phosphate injection 10 mg (10 mg Intravenous Given 8/31/21 1341)   albuterol sulfate HFA (PROVENTIL HFA;VENTOLIN HFA;PROAIR HFA) inhaler 6 puff (6 puffs Inhalation Given 8/31/21 1342)   AZITHROMYCIN 500 MG/250 ML 0.9% NS IVPB (vial-mate) (500 mg Intravenous New Bag 8/31/21 1440)   sodium chloride 0.9 % bolus 1,000 mL (1,000 mL Intravenous New Bag 8/31/21 1518)     Vital Signs:  Temp:  [99.4 °F (37.4 °C)] 99.4 °F (37.4 °C)  Heart Rate:  [60-94] 60  Resp:  [20-26] 20  BP: (110-124)/(57-73) 124/73        08/31/21  1255   Weight: 85.3 kg (188 lb)     Body mass index is 24.8 kg/m².    Physical Exam:     General Appearance:  Alert and cooperative. Tearful.   Head:  Atraumatic and normocephalic.   Eyes: Conjunctivae and sclerae normal, no icterus. No pallor.   Ears:  Ears with no abnormalities noted.   Throat: No oral lesions, no thrush, oral mucosa moist.   Neck: Supple, trachea midline, no thyromegaly.   Back:   No kyphoscoliosis present. No tenderness to palpation.   Lungs:   Breath sounds heard bilaterally equally.  No crackles or wheezing. No Pleural rub or bronchial breathing.   Heart:  Normal S1 and S2, no murmur, no gallop, no rub. No JVD.   Abdomen:   Normal bowel sounds, no masses, no organomegaly. Soft, nontender, nondistended, no rebound tenderness.   Extremities: Supple, no edema, no cyanosis, no clubbing.   Pulses: Pulses palpable bilaterally.   Skin: No bleeding or rash.   Neurologic: Alert and oriented x 3. No facial asymmetry. Moves all  four limbs. No tremors.      Laboratory data:    I have reviewed the labs done in the emergency room.    Results from last 7 days   Lab Units 08/31/21  1310   SODIUM mmol/L 140   POTASSIUM mmol/L 4.4   CHLORIDE mmol/L 100   CO2 mmol/L 25.2   BUN mg/dL 19   CREATININE mg/dL 1.14   CALCIUM mg/dL 9.0   BILIRUBIN mg/dL 0.5   ALK PHOS U/L 72   ALT (SGPT) U/L 54*   AST (SGOT) U/L 100*   GLUCOSE mg/dL 133*     Results from last 7 days   Lab Units 08/31/21  1310   WBC 10*3/mm3 8.83   HEMOGLOBIN g/dL 16.0   HEMATOCRIT % 46.3   PLATELETS 10*3/mm3 268         Results from last 7 days   Lab Units 08/31/21  1310   TROPONIN T ng/mL <0.010     Results from last 7 days   Lab Units 08/31/21  1310   PROBNP pg/mL 223.2                       Invalid input(s): USDES,  BLOODU, NITRITITE, BACT, EP    Pain Management Panel    There is no flowsheet data to display.         EKG:      Normal sinus rhythm at a rate of 69 with a QTc of 459.    Radiology:    XR Chest 1 View    Result Date: 8/31/2021  PROCEDURE: XR CHEST 1 VW-  HISTORY: SOA Triage Protocol  COMPARISON:  None  FINDINGS:  Portable view of the chest demonstrates multifocal airspace opacities, right greater than left suspicious for pneumonia, likely viral. There is no evidence of effusion, pneumothorax or other significant pleural disease. The mediastinum is unremarkable.  The heart size is normal.      Findings suspicious for pneumonia, likely viral  This report was finalized on 8/31/2021 2:03 PM by Amol Tan MD.      Assessment:    1. Acute Hypoxic Respiratory Failure 2/2 Bilateral COVID-19 Pneumonia, POA  2. Severe, Bilateral COVID-19 Pneumonia, POA  3. Elevated D-dimer  4. Lactic Acidosis    Plan:    - Patient requiring high flow nasal cannula at 30L with FiO2 100% to maintain oxygen saturation >90%  - Will increase Dexamethasone to 10mg bid  - Unfortunately given the length of time of patient's current symptoms of 10 days, he is outside of the window for Remdesivir  "administration and we do not have Actemra at our facility due to nationwide shortage.  - Given elevated D-dimer of 1.63, will proceed with CT chest PE protocol to rule out PE  - Procal negative at this time, with normal WBC. Will hold off on initiation of antibiotics as I have low suspicion for superimposed bacterial pneumonia.  - Albuterol inhaler  - Continuous pulse oximetry  - PT/OT  - Remains high risk for decompensation, requiring intubation.    Risk Assessment: Patient is high risk given hypoxic respiratory failure secondary to COVID-19 pneumonia. High risk for decompensation requiring intubation.   DVT Prophylaxis: Lovenox  Code Status: full  Diet: Regular    Advance Care Planning   ACP discussion was held with the patient during this visit. Patient does not have an advance directive, declines further assistance.      Hue Villalobos DO  08/31/21  15:48 EDT    Dictated utilizing Dragon dictation.    Electronically signed by Hue Villalobos DO at 08/31/21 1744          Emergency Department Notes      Jairo Aguirre MD at 08/31/21 1320          Subjective   51-year-old male presenting with shortness of breath.  He was diagnosed with Covid recently, has been feeling bad for about 10 days, increasing shortness of breath, some cough and fever.  Was seen 3 days ago at outside hospital and had CT scan that showed \"double pneumonia\".  He denies any vomiting or diarrhea.  He is otherwise healthy, takes no medications, does not smoke, runs several times a week.  Is not vaccinated.          Review of Systems    History reviewed. No pertinent past medical history.    No Known Allergies    History reviewed. No pertinent surgical history.    History reviewed. No pertinent family history.    Social History     Socioeconomic History   • Marital status:      Spouse name: Not on file   • Number of children: Not on file   • Years of education: Not on file   • Highest education level: Not on file   Tobacco " Use   • Smoking status: Never Smoker   Substance and Sexual Activity   • Alcohol use: Yes     Comment: OCCASIONALLY           Objective   Physical Exam  Vitals reviewed.   Constitutional:       Appearance: Normal appearance. He is not ill-appearing, toxic-appearing or diaphoretic.      Comments: Distressed   HENT:      Head: Normocephalic and atraumatic.      Right Ear: External ear normal.      Left Ear: External ear normal.      Nose: Nose normal.      Mouth/Throat:      Mouth: Mucous membranes are moist.      Pharynx: Oropharynx is clear.   Eyes:      Extraocular Movements: Extraocular movements intact.      Conjunctiva/sclera: Conjunctivae normal.      Pupils: Pupils are equal, round, and reactive to light.   Cardiovascular:      Rate and Rhythm: Normal rate and regular rhythm.      Pulses: Normal pulses.      Heart sounds: Normal heart sounds.   Pulmonary:      Comments: Increased work of breathing, coarse breath sounds throughout  Abdominal:      General: Bowel sounds are normal. There is no distension.      Tenderness: There is no abdominal tenderness.   Musculoskeletal:         General: No swelling, tenderness or deformity. Normal range of motion.      Cervical back: Normal range of motion and neck supple.   Skin:     General: Skin is warm and dry.      Capillary Refill: Capillary refill takes less than 2 seconds.      Findings: No rash.   Neurological:      General: No focal deficit present.      Mental Status: He is alert and oriented to person, place, and time.   Psychiatric:         Mood and Affect: Mood normal.         Behavior: Behavior normal.         Procedures          ED Course                                           MDM  Number of Diagnoses or Management Options  Acute respiratory failure with hypoxia (CMS/HCC)  COVID-19  Pneumonia due to COVID-19 virus  Diagnosis management comments: 51-year-old male with shortness of breath in the setting of COVID-19.  Well-developed, well-nourished man who is  distressed, tachypneic.  He is profoundly hypoxic.  Will obtain labs, EKG and chest x-ray.  Sound like he is already had a CT scan recently so will not repeat D-dimer or CT scan.  Will give albuterol, Decadron.  We will start him on high flow nasal cannula.  Disposition is to the hospital.    DDx: COVID-19, pneumonia    EKG interpreted by me: Sinus rhythm, normal rate, borderline QT, right axis, no acute ST/T wave changes, this is an abnormal EKG    Blood work notable for mild lactic acidosis.  Chest x-ray reveals bilateral opacities consistent with Covid pneumonia.  He feels much improved now on high flow nasal cannula.  Discussed with Dr. Pink, he requested a D-dimer.  Will admit for further treatment and monitoring.  Patient is agreeable with plan of care.    30 minutes of critical care provided. This time excludes other billable procedures. Time does include preparation of documents, medical consultations, review of old records, and direct bedside care. Patient was at high risk for life-threatening deterioration due to hypoxic respiratory failure, COVID-19, Covid pneumonia.       Critical Care  Total time providing critical care: 30-74 minutes      Final diagnoses:   COVID-19   Acute respiratory failure with hypoxia (CMS/HCC)   Pneumonia due to COVID-19 virus          Jairo Aguirre MD  08/31/21 1445      Electronically signed by Jairo Aguirre MD at 08/31/21 1445       Vital Signs (last day)     Date/Time   Temp   Temp src   Pulse   Resp   BP   Patient Position   SpO2    09/01/21 1100   97.3 (36.3)   Oral   (!) 44   20   135/86   Lying   (!) 88    09/01/21 0700   97.9 (36.6)   Oral   53   20   111/66   Lying   (!) 87    09/01/21 0507   --   --   --   --   --   --   91    09/01/21 0427   97.6 (36.4)   Axillary   (!) 43   20   130/80   Lying   (!) 84    09/01/21 0026   96.7 (35.9)   Axillary   (!) 39   20   134/79   Lying   98    08/31/21 2040   --   Axillary   50   20   124/70   Lying   98     08/31/21 1700   --   --   (!) 49   20   136/80   Lying   98    08/31/21 15:17:31   --   --   60   20   124/73   Sitting   96    08/31/21 1500   --   --   67   --   --   --   96    08/31/21 1430   --   --   64   --   --   --   95    08/31/21 1341   --   --   94   22   --   --   98    08/31/21 1300   --   --   --   --   --   --   (!) 88    08/31/21 1255   99.4 (37.4)   Oral   83   26   110/57   Sitting   (!) 80                Current Facility-Administered Medications   Medication Dose Route Frequency Provider Last Rate Last Admin   • acetaminophen (TYLENOL) tablet 650 mg  650 mg Oral Q4H PRN Hue Villalobos DO        Or   • acetaminophen (TYLENOL) 160 MG/5ML solution 650 mg  650 mg Oral Q4H PRN Hue Villalobos DO        Or   • acetaminophen (TYLENOL) suppository 650 mg  650 mg Rectal Q4H PRN Hue Villalobos DO       • albuterol sulfate HFA (PROVENTIL HFA;VENTOLIN HFA;PROAIR HFA) inhaler 2 puff  2 puff Inhalation Q6H PRN Hue Villalobos DO       • dexamethasone (DECADRON) injection 10 mg  10 mg Intravenous Q12H Selina Pink, DO   10 mg at 09/01/21 0245   • enoxaparin (LOVENOX) syringe 40 mg  40 mg Subcutaneous Q24H Hue Villalobos DO   40 mg at 08/31/21 1940   • guaiFENesin-dextromethorphan (ROBITUSSIN DM) 100-10 MG/5ML syrup 15 mL  15 mL Oral Q4H PRN Hue Villalobos, DO       • ondansetron (ZOFRAN) injection 4 mg  4 mg Intravenous Q6H PRN Hue Villalobos DO       • PRO-STAT oral liquid 30 mL  30 mL Oral BID Hue Villalobos DO   30 mL at 09/01/21 1019   • sodium chloride 0.9 % flush 10 mL  10 mL Intravenous PRN Emergency, Triage Protocol, MD   10 mL at 08/31/21 1940   • sodium chloride 0.9 % flush 10 mL  10 mL Intravenous Q12H Hue Villalobos DO   10 mL at 09/01/21 1020   • sodium chloride 0.9 % flush 10 mL  10 mL Intravenous PRN Hue Villalobos, DO   10 mL at 09/01/21 0245       Lab Results (last 24 hours)     Procedure Component Value Units Date/Time    Manual Differential  "[[id number]]  (Abnormal) Collected: 09/01/21 0624    Specimen: Blood Updated: 09/01/21 0837     Neutrophil % 67.0 %      Lymphocyte % 18.0 %      Monocyte % 7.0 %      Bands %  3.0 %      Metamyelocyte % 1.0 %      Atypical Lymphocyte % 4.0 %      Neutrophils Absolute 5.15 10*3/mm3      Lymphocytes Absolute 1.62 10*3/mm3      Monocytes Absolute 0.52 10*3/mm3      RBC Morphology Normal     WBC Morphology Normal     Platelet Estimate Adequate    CBC Auto Differential [[id number]]  (Abnormal) Collected: 09/01/21 0624    Specimen: Blood Updated: 09/01/21 0837     WBC 7.36 10*3/mm3      RBC 4.61 10*6/mm3      Hemoglobin 14.8 g/dL      Hematocrit 43.0 %      MCV 93.3 fL      MCH 32.1 pg      MCHC 34.4 g/dL      RDW 11.9 %      RDW-SD 40.6 fl      MPV 11.2 fL      Platelets 278 10*3/mm3     Scan Slide [[id number]] Collected: 09/01/21 0624    Specimen: Blood Updated: 09/01/21 0837     Dacrocytes Slight/1+     Ovalocytes Slight/1+     Platelet Estimate Adequate     Scan Slide --     Comment: See Manual Differential Results       Procalcitonin [[id number]]  (Normal) Collected: 09/01/21 0624    Specimen: Blood Updated: 09/01/21 0711     Procalcitonin 0.10 ng/mL     Narrative:      As a Marker for Sepsis (Non-Neonates):     1. <0.5 ng/mL represents a low risk of severe sepsis and/or septic shock.  2. >2 ng/mL represents a high risk of severe sepsis and/or septic shock.    As a Marker for Lower Respiratory Tract Infections that require antibiotic therapy:  PCT on Admission     Antibiotic Therapy             6-12 Hrs later  >0.5                          Strongly Recommended            >0.25 - <0.5             Recommended  0.1 - 0.25                  Discouraged                       Remeasure/reassess PCT  <0.1                         Strongly Discouraged         Remeasure/reassess PCT      As 28 day mortality risk marker: \"Change in Procalcitonin Result\" (>80% or <=80%) if Day 0 (or Day 1) and Day 4 values are available. Refer to " http://www.[facility]-pct-calculator.com/    Change in PCT <=80 %   A decrease of PCT levels below or equal to 80% defines a positive change in PCT test result representing a higher risk for 28-day all-cause mortality of patients diagnosed with severe sepsis or septic shock.    Change in PCT >80 %   A decrease of PCT levels of more than 80% defines a negative change in PCT result representing a lower risk for 28-day all-cause mortality of patients diagnosed with severe sepsis or septic shock.              Results may be falsely decreased if patient taking Biotin.     Basic Metabolic Panel [[id number]]  (Abnormal) Collected: 09/01/21 0624    Specimen: Blood Updated: 09/01/21 0703     Glucose 149 mg/dL      BUN 18 mg/dL      Creatinine 0.79 mg/dL      Sodium 137 mmol/L      Potassium 4.5 mmol/L      Chloride 103 mmol/L      CO2 23.4 mmol/L      Calcium 8.3 mg/dL      eGFR Non African Amer 104 mL/min/1.73      BUN/Creatinine Ratio 22.8     Anion Gap 10.6 mmol/L     Narrative:      GFR Normal >60  Chronic Kidney Disease <60  Kidney Failure <15      STAT Lactic Acid, Reflex [[id number]]  (Normal) Collected: 08/31/21 1830    Specimen: Blood Updated: 08/31/21 1856     Lactate 1.6 mmol/L         Imaging Results (Last 24 Hours)     Procedure Component Value Units Date/Time    CT Chest Pulmonary Embolism [[id number]] Collected: 08/31/21 2240     Updated: 08/31/21 2241    Narrative:      FINAL REPORT    TECHNIQUE:  Thin section axial images were obtained through the chest and  during the arterial phase of IV contrast administration. Coronal  3-D MIP reconstructed images were also provided.    CLINICAL HISTORY:  PE suspected, high prob, covid +    FINDINGS:  The pulmonary arteries are well-opacified and there is no  filling defect to indicate pulmonary embolism. The thoracic  aorta is normal. There are multifocal bilateral patchy areas of  groundglass/airspace opacity consistent with pneumonia,  including viral pneumonia. There is no  pleural disease,  adenopathy or significant osseous abnormality.      Impression:      Viral pneumonia. No pulmonary embolism.    Authenticated by Reinaldo Bautista M.D. on 08/31/2021 10:40:07 PM        Physician Progress Notes (last 24 hours) (Notes from 08/31/21 1206 through 09/01/21 1206)    No notes of this type exist for this encounter.

## 2021-09-01 NOTE — PLAN OF CARE
Goal Outcome Evaluation:  Plan of Care Reviewed With: patient        Progress: improving  Outcome Summary: PT continues on HF nasal canula. Pt transfered to chair with less reported SOA.

## 2021-09-01 NOTE — CASE MANAGEMENT/SOCIAL WORK
Discharge Planning Assessment   [facility]     Patient Name: Alberto Torres  MRN: [id number]  Today's Date: 9/1/2021    Admit Date: 8/31/2021    Discharge Needs Assessment     Row Name 09/01/21 2944       Living Environment    Lives With  significant other    Name(s) of Who Lives With Patient  Girfrienester Khalil    Current Living Arrangements  home/apartment/condo    Potentially Unsafe Housing Conditions  unable to assess    Primary Care Provided by  self    Provides Primary Care For  no one    Caregiving Concerns  Pt currently  receiving high flow o2 at 35 liters    Family Caregiver if Needed  none    Quality of Family Relationships  supportive    Able to Return to Prior Arrangements  yes    Living Arrangement Comments  Lives in a one story house with his girlfriend Thania Khalil.  Has steps and was having difficulty with them due to his SOA.       Resource/Environmental Concerns    Resource/Environmental Concerns  none    Transportation Concerns  car, none       Transition Planning    Patient/Family Anticipates Transition to  home with family    Patient/Family Anticipated Services at Transition  none    Transportation Anticipated  family or friend will provide       Discharge Needs Assessment    Readmission Within the Last 30 Days  no previous admission in last 30 days    Concerns to be Addressed  discharge planning Will likely need o2 at discharge, chooses [facility]    Equipment Needed After Discharge  oxygen    Provided Post Acute Provider List?  Yes    Post Acute Provider List  DME Supplier Verbal List    Provided Post Acute Provider Quality & Resource List?  N/A    Delivered To  Patient    Method of Delivery  Telephone    Patient's Choice of Community Agency(s)  [facility]        Discharge Plan     Row Name 09/01/21 4090       Plan    Plan Called to pt's room via his cell phone.  Was able to speak with him.  Has no POA or Living Will.  Lives with his girlfriend Lilly Khalil. Address and phone no is  correct.  States she is sick too but not as sick as he is.  His sister Isabel Alva is his Emergency Contact. Lives a couple of miles from him.  Spoke also to Isabel on the phone but CM had already call pt to discuss DCP. .  Lives in a house, does have steps and has been difficult for him since he is SOA.  Independent with [employer] has no medical equipment at home.  Currently pt is on 35 liters o2 and will likely need it at discharge.  Chooses [facility].  Does plan to go home and will have transportation. Denies further DC needs at this time.  Cm will continue to follow.         Continued Care and Services - Admitted Since 8/31/2021    Coordination has not been started for this encounter.         Demographic Summary     Row Name 09/01/21 1251       General Information    Admission Type  inpatient    Arrived From  emergency department    Expected Length of Stay (LOS)  5 days    Referral Source  admission list    Reason for Consult  discharge planning     Used During This Interaction  no        Functional Status     Row Name 09/01/21 1252       Functional Status    Usual Activity Tolerance  moderate    Current Activity Tolerance  poor    Functional Status Comments  Independent       Functional Status, IADL    Medications  independent    Meal Preparation  assistive person    Housekeeping  assistive person    Laundry  assistive person    Shopping  assistive person    IADL Comments  self       Mental Status Summary    Recent Changes in Mental Status/Cognitive Functioning  no changes    Mental Status Comments  Alert, oriented and pleasant           Radha Christie RN

## 2021-09-01 NOTE — PROGRESS NOTES
"    [facility]    Name:  Alberto Torres   Age:  51 y.o.  Sex:  male  :  1970  MRN:  [id number]   Visit Number:  [id number]  Admission Date:  2021  Date Of Service:  21  Primary Care Physician:  Yajaira Saleh APRN     LOS: 1 day :    Chief Complaint:      Shortness of breath, COVID-19 Pneumonia    Subjective:    Patient seen and examined at bedside this morning.  Chart reviewed and events noted.  He states that he is doing \"okay.\"  He was using his incentive spirometer when I entered the room.  He states that he is trying to use this as much as possible and is trying to sit in the bedside chair as much as possible.  He does continue to require 30 L high flow nasal cannula to maintain an oxygen saturation of greater than 90%. Denies chest pain or palpitations.    Hospital Course:    Mr. Torres is a 51-year-old male with no significant past medical history that presents to the hospital with complaints of worsening shortness of breath. Was diagnosed with COVID on 21. Continued to have worsening shortness of breath and upon arrival to the ED was found to be hypoxic with oxygen saturation of 80% on 10L. He was placed on 30L high flow nasal cannula, 100% FiO2 which improved oxygen saturation to 98%. He was then admitted to the hospital and started on Dexamethasone 10mg bid.    Review of Systems:     All systems were reviewed and negative except as mentioned in subjective, assessment and plan.    Vital Signs:    Temp:  [96.7 °F (35.9 °C)-97.9 °F (36.6 °C)] 97.3 °F (36.3 °C)  Heart Rate:  [39-53] 44  Resp:  [20] 20  BP: (111-136)/(66-86) 135/86    Intake and output:    I/O last 3 completed shifts:  In: 490 [P.O.:240; IV Piggyback:250]  Out: 500 [Urine:500]  I/O this shift:  In: 480 [P.O.:480]  Out: -     Physical Examination:    General Appearance:  Alert and cooperative. Resting comfortably in bed. In NAD.   Head:  Atraumatic and normocephalic.    Eyes: " Conjunctivae and sclerae normal, no icterus. No pallor.   Throat: No oral lesions, no thrush, oral mucosa moist.   Neck: Supple, trachea midline, no thyromegaly.   Lungs:   Breath sounds diffusely diminished bilaterally with scattered ronchi throughout. No wheezing.   Heart:  Normal S1 and S2, no murmur, no gallop, no rub. No JVD.   Abdomen:   Normal bowel sounds, no masses, no organomegaly. Soft, nontender, nondistended, no rebound tenderness.   Extremities: Supple, no edema, no cyanosis, no clubbing.   Skin: No bleeding or rash.   Neurologic: Alert and oriented x 3. No facial asymmetry. Moves all four limbs. No tremors.      Laboratory results:    Results from last 7 days   Lab Units 09/01/21  0624 08/31/21  1310   SODIUM mmol/L 137 140   POTASSIUM mmol/L 4.5 4.4   CHLORIDE mmol/L 103 100   CO2 mmol/L 23.4 25.2   BUN mg/dL 18 19   CREATININE mg/dL 0.79 1.14   CALCIUM mg/dL 8.3* 9.0   BILIRUBIN mg/dL  --  0.5   ALK PHOS U/L  --  72   ALT (SGPT) U/L  --  54*   AST (SGOT) U/L  --  100*   GLUCOSE mg/dL 149* 133*     Results from last 7 days   Lab Units 09/01/21  0624 08/31/21  1310   WBC 10*3/mm3 7.36 8.83   HEMOGLOBIN g/dL 14.8 16.0   HEMATOCRIT % 43.0 46.3   PLATELETS 10*3/mm3 278 268         Results from last 7 days   Lab Units 08/31/21  1310   TROPONIN T ng/mL <0.010     Results from last 7 days   Lab Units 08/31/21  1333   BLOODCX  No growth at 24 hours  No growth at 24 hours         I have reviewed the patient's laboratory results.    Radiology results:    XR Chest 1 View    Result Date: 8/31/2021  PROCEDURE: XR CHEST 1 VW-  HISTORY: SOA Triage Protocol  COMPARISON:  None  FINDINGS:  Portable view of the chest demonstrates multifocal airspace opacities, right greater than left suspicious for pneumonia, likely viral. There is no evidence of effusion, pneumothorax or other significant pleural disease. The mediastinum is unremarkable.  The heart size is normal.      Impression: Findings suspicious for pneumonia,  likely viral  This report was finalized on 8/31/2021 2:03 PM by Amol Tan MD.    CT Chest Pulmonary Embolism    Result Date: 8/31/2021  FINAL REPORT TECHNIQUE: Thin section axial images were obtained through the chest and during the arterial phase of IV contrast administration. Coronal 3-D MIP reconstructed images were also provided. CLINICAL HISTORY: PE suspected, high prob, covid + FINDINGS: The pulmonary arteries are well-opacified and there is no filling defect to indicate pulmonary embolism. The thoracic aorta is normal. There are multifocal bilateral patchy areas of groundglass/airspace opacity consistent with pneumonia, including viral pneumonia. There is no pleural disease, adenopathy or significant osseous abnormality.     Impression: Viral pneumonia. No pulmonary embolism. Authenticated by Reinaldo Bautista M.D. on 08/31/2021 10:40:07 PM    I have reviewed the patient's radiology reports.    Medication Review:     I have reviewed the patient's active and prn medications.     Problem List:      COVID-19      Assessment:    1. Acute Hypoxic Respiratory Failure 2/2 Bilateral COVID-19 Pneumonia, POA  2. Severe, Bilateral COVID-19 Pneumonia, POA  3. Elevated D-dimer  4. Lactic Acidosis, Improved    Plan:    - Patient continues to require high flow nasal cannula at 35L, 90% FiO2 to maintain an oxygen saturation of greater than 90%.   - Continue Dexamethasone 10mg bid  - Patient is outside of the window for Remdesivir administration  - CT chest revealed no PE  - Procalcitonin negative. I have a low suspicion for superimposed bacterial pneumonia and therefore will hold off on antibiotics.  - Albuterol inhaler  - Continuous pulse oximetry  - PT/OT  - Incentive spirometry  - Continues to remain high risk for decompensation, requiring intubation. Patient is agreeable if needed.    DVT Prophylaxis: Lovenox  Code Status: Full  Diet: Regular  Discharge Plan: To be determined. Likely greater than 72 hours.    Hue CAT  DO Wilfredo  09/01/21  15:44 EDT    Dictated utilizing Dragon dictation.

## 2021-09-02 LAB
ANION GAP SERPL CALCULATED.3IONS-SCNC: 8.4 MMOL/L (ref 5–15)
BUN SERPL-MCNC: 20 MG/DL (ref 6–20)
BUN/CREAT SERPL: 26.7 (ref 7–25)
CALCIUM SPEC-SCNC: 8.7 MG/DL (ref 8.6–10.5)
CHLORIDE SERPL-SCNC: 103 MMOL/L (ref 98–107)
CO2 SERPL-SCNC: 24.6 MMOL/L (ref 22–29)
CREAT SERPL-MCNC: 0.75 MG/DL (ref 0.76–1.27)
DEPRECATED RDW RBC AUTO: 39.8 FL (ref 37–54)
ERYTHROCYTE [DISTWIDTH] IN BLOOD BY AUTOMATED COUNT: 11.7 % (ref 12.3–15.4)
GFR SERPL CREATININE-BSD FRML MDRD: 110 ML/MIN/1.73
GLUCOSE SERPL-MCNC: 144 MG/DL (ref 65–99)
HCT VFR BLD AUTO: 40.7 % (ref 37.5–51)
HGB BLD-MCNC: 14.3 G/DL (ref 13–17.7)
LYMPHOCYTES # BLD MANUAL: 1.68 10*3/MM3 (ref 0.7–3.1)
LYMPHOCYTES NFR BLD MANUAL: 11 % (ref 19.6–45.3)
LYMPHOCYTES NFR BLD MANUAL: 4 % (ref 5–12)
MCH RBC QN AUTO: 32.4 PG (ref 26.6–33)
MCHC RBC AUTO-ENTMCNC: 35.1 G/DL (ref 31.5–35.7)
MCV RBC AUTO: 92.3 FL (ref 79–97)
METAMYELOCYTES NFR BLD MANUAL: 1 % (ref 0–0)
MONOCYTES # BLD AUTO: 0.37 10*3/MM3 (ref 0.1–0.9)
NEUTROPHILS # BLD AUTO: 7.18 10*3/MM3 (ref 1.7–7)
NEUTROPHILS NFR BLD MANUAL: 77 % (ref 42.7–76)
PLATELET # BLD AUTO: 327 10*3/MM3 (ref 140–450)
PMV BLD AUTO: 11.3 FL (ref 6–12)
POTASSIUM SERPL-SCNC: 4.7 MMOL/L (ref 3.5–5.2)
RBC # BLD AUTO: 4.41 10*6/MM3 (ref 4.14–5.8)
RBC MORPH BLD: NORMAL
SCAN SLIDE: NORMAL
SMALL PLATELETS BLD QL SMEAR: ADEQUATE
SODIUM SERPL-SCNC: 136 MMOL/L (ref 136–145)
VARIANT LYMPHS NFR BLD MANUAL: 7 % (ref 0–5)
WBC # BLD AUTO: 9.32 10*3/MM3 (ref 3.4–10.8)
WBC MORPH BLD: NORMAL

## 2021-09-02 PROCEDURE — 94799 UNLISTED PULMONARY SVC/PX: CPT

## 2021-09-02 PROCEDURE — 85007 BL SMEAR W/DIFF WBC COUNT: CPT | Performed by: STUDENT IN AN ORGANIZED HEALTH CARE EDUCATION/TRAINING PROGRAM

## 2021-09-02 PROCEDURE — 80048 BASIC METABOLIC PNL TOTAL CA: CPT | Performed by: STUDENT IN AN ORGANIZED HEALTH CARE EDUCATION/TRAINING PROGRAM

## 2021-09-02 PROCEDURE — 99231 SBSQ HOSP IP/OBS SF/LOW 25: CPT | Performed by: STUDENT IN AN ORGANIZED HEALTH CARE EDUCATION/TRAINING PROGRAM

## 2021-09-02 PROCEDURE — 25010000002 ENOXAPARIN PER 10 MG: Performed by: STUDENT IN AN ORGANIZED HEALTH CARE EDUCATION/TRAINING PROGRAM

## 2021-09-02 PROCEDURE — 85025 COMPLETE CBC W/AUTO DIFF WBC: CPT | Performed by: STUDENT IN AN ORGANIZED HEALTH CARE EDUCATION/TRAINING PROGRAM

## 2021-09-02 PROCEDURE — 25010000002 DEXAMETHASONE PER 1 MG: Performed by: FAMILY MEDICINE

## 2021-09-02 RX ADMIN — SODIUM CHLORIDE, PRESERVATIVE FREE 10 ML: 5 INJECTION INTRAVENOUS at 20:37

## 2021-09-02 RX ADMIN — DEXAMETHASONE SODIUM PHOSPHATE 10 MG: 10 INJECTION INTRAMUSCULAR; INTRAVENOUS at 02:29

## 2021-09-02 RX ADMIN — SODIUM CHLORIDE, PRESERVATIVE FREE 10 ML: 5 INJECTION INTRAVENOUS at 14:20

## 2021-09-02 RX ADMIN — DEXAMETHASONE SODIUM PHOSPHATE 10 MG: 10 INJECTION INTRAMUSCULAR; INTRAVENOUS at 14:19

## 2021-09-02 RX ADMIN — SODIUM CHLORIDE, PRESERVATIVE FREE 10 ML: 5 INJECTION INTRAVENOUS at 08:56

## 2021-09-02 RX ADMIN — Medication 30 ML: at 20:37

## 2021-09-02 RX ADMIN — Medication 30 ML: at 11:53

## 2021-09-02 RX ADMIN — ENOXAPARIN SODIUM 40 MG: 40 INJECTION SUBCUTANEOUS at 20:36

## 2021-09-02 NOTE — PROGRESS NOTES
"    [facility]    Name:  Alberto Torres   Age:  51 y.o.  Sex:  male  :  1970  MRN:  [id number]   Visit Number:  [id number]  Admission Date:  2021  Date Of Service:  21  Primary Care Physician:  Yajaira Saleh APRN     LOS: 2 days :    Chief Complaint:      Shortness of breath, COVID-19 Pneumonia    Subjective:    Patient seen and examined at bedside this morning.  Chart reviewed and events noted.  He states that he \"feels a lot better today.\"  He was sitting in the bedside chair, using incentive spirometer.  He now requires 50 L high flow nasal cannula with 80% FiO2 to maintain an oxygen saturation of greater than 90%. Denies chest pain or palpitations.    Hospital Course:    Mr. Torres is a 51-year-old male with no significant past medical history that presents to the hospital with complaints of worsening shortness of breath. Was diagnosed with COVID on 21. Continued to have worsening shortness of breath and upon arrival to the ED was found to be hypoxic with oxygen saturation of 80% on 10L. He was placed on 30L high flow nasal cannula, 100% FiO2 which improved oxygen saturation to 98%. He was then admitted to the hospital and started on Dexamethasone 10mg bid.    Review of Systems:     All systems were reviewed and negative except as mentioned in subjective, assessment and plan.    Vital Signs:    Temp:  [97 °F (36.1 °C)-97.7 °F (36.5 °C)] 97 °F (36.1 °C)  Heart Rate:  [40-67] 67  Resp:  [18-20] 18  BP: (115-151)/(68-87) 117/74    Intake and output:    I/O last 3 completed shifts:  In: 960 [P.O.:960]  Out: 1200 [Urine:1200]  I/O this shift:  In: -   Out: 500 [Urine:500]    Physical Examination:    General Appearance:  Alert and cooperative. Sitting in bedside chair. In NAD.   Head:  Atraumatic and normocephalic.    Eyes: Conjunctivae and sclerae normal, no icterus. No pallor.   Throat: No oral lesions, no thrush, oral mucosa moist.   Neck: " Supple, trachea midline, no thyromegaly.   Lungs:   Breath sounds diffusely diminished bilaterally with scattered ronchi throughout. No wheezing.   Heart:  Normal S1 and S2, no murmur, no gallop, no rub. No JVD.   Abdomen:   Normal bowel sounds, no masses, no organomegaly. Soft, nontender, nondistended, no rebound tenderness.   Extremities: Supple, no edema, no cyanosis, no clubbing.   Skin: No bleeding or rash.   Neurologic: Alert and oriented x 3. No facial asymmetry. Moves all four limbs. No tremors.      Laboratory results:    Results from last 7 days   Lab Units 09/02/21  0543 09/01/21  0624 08/31/21  1310   SODIUM mmol/L 136 137 140   POTASSIUM mmol/L 4.7 4.5 4.4   CHLORIDE mmol/L 103 103 100   CO2 mmol/L 24.6 23.4 25.2   BUN mg/dL 20 18 19   CREATININE mg/dL 0.75* 0.79 1.14   CALCIUM mg/dL 8.7 8.3* 9.0   BILIRUBIN mg/dL  --   --  0.5   ALK PHOS U/L  --   --  72   ALT (SGPT) U/L  --   --  54*   AST (SGOT) U/L  --   --  100*   GLUCOSE mg/dL 144* 149* 133*     Results from last 7 days   Lab Units 09/02/21  0543 09/01/21  0624 08/31/21  1310   WBC 10*3/mm3 9.32 7.36 8.83   HEMOGLOBIN g/dL 14.3 14.8 16.0   HEMATOCRIT % 40.7 43.0 46.3   PLATELETS 10*3/mm3 327 278 268         Results from last 7 days   Lab Units 08/31/21  1310   TROPONIN T ng/mL <0.010     Results from last 7 days   Lab Units 08/31/21  1333   BLOODCX  No growth at 2 days  No growth at 2 days         I have reviewed the patient's laboratory results.    Radiology results:    CT Chest Pulmonary Embolism    Result Date: 8/31/2021  FINAL REPORT TECHNIQUE: Thin section axial images were obtained through the chest and during the arterial phase of IV contrast administration. Coronal 3-D MIP reconstructed images were also provided. CLINICAL HISTORY: PE suspected, high prob, covid + FINDINGS: The pulmonary arteries are well-opacified and there is no filling defect to indicate pulmonary embolism. The thoracic aorta is normal. There are multifocal bilateral  patchy areas of groundglass/airspace opacity consistent with pneumonia, including viral pneumonia. There is no pleural disease, adenopathy or significant osseous abnormality.     Impression: Viral pneumonia. No pulmonary embolism. Authenticated by Reinaldo Bautista M.D. on 08/31/2021 10:40:07 PM    I have reviewed the patient's radiology reports.    Medication Review:     I have reviewed the patient's active and prn medications.     Problem List:      COVID-19      Assessment:    1. Acute Hypoxic Respiratory Failure 2/2 Bilateral COVID-19 Pneumonia, POA  2. Severe, Bilateral COVID-19 Pneumonia, POA  3. Elevated D-dimer  4. Lactic Acidosis, Improved    Plan:    - Patient continues to require high flow nasal cannula now at 50L with 80%FiO2 to maintain oxygen saturation greater than 90%   - Continue Dexamethasone 10mg bid  - Patient is outside of the window for Remdesivir administration  - CT chest revealed no PE  - Procalcitonin negative. I have a low suspicion for superimposed bacterial pneumonia and therefore will hold off on antibiotics.  - Albuterol inhaler  - Continuous pulse oximetry  - PT/OT  - Incentive spirometry  - Continues to remain high risk for decompensation, requiring intubation. Patient is agreeable if needed.    DVT Prophylaxis: Lovenox  Code Status: Full  Diet: Regular  Discharge Plan: To be determined. Likely greater than 72 hours.    Hue Villalobos DO  09/02/21  15:52 EDT    Dictated utilizing Dragon dictation.

## 2021-09-02 NOTE — PLAN OF CARE
Goal Outcome Evaluation:         Patient up in chair, oxygen [id number], will monitor patient.

## 2021-09-02 NOTE — PLAN OF CARE
Goal Outcome Evaluation:  Plan of Care Reviewed With: patient        Progress: improving  Outcome Summary: no acute events noted this shift.  encouraged use of IS and prone positioning while asleep with patient verbalizing understanding.  maintaining o2 sats above 90 on o2 via high flow nc.  safety precautions ongoing.  will cont. to monitor.

## 2021-09-03 LAB
ANION GAP SERPL CALCULATED.3IONS-SCNC: 10.8 MMOL/L (ref 5–15)
BASOPHILS # BLD AUTO: 0.03 10*3/MM3 (ref 0–0.2)
BASOPHILS NFR BLD AUTO: 0.4 % (ref 0–1.5)
BUN SERPL-MCNC: 19 MG/DL (ref 6–20)
BUN/CREAT SERPL: 26.8 (ref 7–25)
CALCIUM SPEC-SCNC: 8.9 MG/DL (ref 8.6–10.5)
CHLORIDE SERPL-SCNC: 101 MMOL/L (ref 98–107)
CO2 SERPL-SCNC: 25.2 MMOL/L (ref 22–29)
CREAT SERPL-MCNC: 0.71 MG/DL (ref 0.76–1.27)
DEPRECATED RDW RBC AUTO: 39.2 FL (ref 37–54)
EOSINOPHIL # BLD AUTO: 0 10*3/MM3 (ref 0–0.4)
EOSINOPHIL NFR BLD AUTO: 0 % (ref 0.3–6.2)
ERYTHROCYTE [DISTWIDTH] IN BLOOD BY AUTOMATED COUNT: 11.6 % (ref 12.3–15.4)
GFR SERPL CREATININE-BSD FRML MDRD: 117 ML/MIN/1.73
GLUCOSE SERPL-MCNC: 132 MG/DL (ref 65–99)
HCT VFR BLD AUTO: 40.9 % (ref 37.5–51)
HGB BLD-MCNC: 14.2 G/DL (ref 13–17.7)
IMM GRANULOCYTES # BLD AUTO: 0.07 10*3/MM3 (ref 0–0.05)
IMM GRANULOCYTES NFR BLD AUTO: 0.9 % (ref 0–0.5)
LYMPHOCYTES # BLD AUTO: 0.81 10*3/MM3 (ref 0.7–3.1)
LYMPHOCYTES NFR BLD AUTO: 9.9 % (ref 19.6–45.3)
MCH RBC QN AUTO: 32.1 PG (ref 26.6–33)
MCHC RBC AUTO-ENTMCNC: 34.7 G/DL (ref 31.5–35.7)
MCV RBC AUTO: 92.5 FL (ref 79–97)
MONOCYTES # BLD AUTO: 0.39 10*3/MM3 (ref 0.1–0.9)
MONOCYTES NFR BLD AUTO: 4.8 % (ref 5–12)
NEUTROPHILS NFR BLD AUTO: 6.88 10*3/MM3 (ref 1.7–7)
NEUTROPHILS NFR BLD AUTO: 84 % (ref 42.7–76)
NRBC BLD AUTO-RTO: 0 /100 WBC (ref 0–0.2)
PLATELET # BLD AUTO: 364 10*3/MM3 (ref 140–450)
PMV BLD AUTO: 11.6 FL (ref 6–12)
POTASSIUM SERPL-SCNC: 4.4 MMOL/L (ref 3.5–5.2)
PROCALCITONIN SERPL-MCNC: 0.06 NG/ML (ref 0–0.25)
RBC # BLD AUTO: 4.42 10*6/MM3 (ref 4.14–5.8)
RBC MORPH BLD: NORMAL
SMALL PLATELETS BLD QL SMEAR: ADEQUATE
SODIUM SERPL-SCNC: 137 MMOL/L (ref 136–145)
WBC # BLD AUTO: 8.18 10*3/MM3 (ref 3.4–10.8)
WBC MORPH BLD: NORMAL

## 2021-09-03 PROCEDURE — 80048 BASIC METABOLIC PNL TOTAL CA: CPT | Performed by: STUDENT IN AN ORGANIZED HEALTH CARE EDUCATION/TRAINING PROGRAM

## 2021-09-03 PROCEDURE — 99231 SBSQ HOSP IP/OBS SF/LOW 25: CPT | Performed by: STUDENT IN AN ORGANIZED HEALTH CARE EDUCATION/TRAINING PROGRAM

## 2021-09-03 PROCEDURE — 85007 BL SMEAR W/DIFF WBC COUNT: CPT | Performed by: STUDENT IN AN ORGANIZED HEALTH CARE EDUCATION/TRAINING PROGRAM

## 2021-09-03 PROCEDURE — 84145 PROCALCITONIN (PCT): CPT | Performed by: STUDENT IN AN ORGANIZED HEALTH CARE EDUCATION/TRAINING PROGRAM

## 2021-09-03 PROCEDURE — 25010000002 ENOXAPARIN PER 10 MG: Performed by: STUDENT IN AN ORGANIZED HEALTH CARE EDUCATION/TRAINING PROGRAM

## 2021-09-03 PROCEDURE — 25010000002 DEXAMETHASONE PER 1 MG: Performed by: FAMILY MEDICINE

## 2021-09-03 PROCEDURE — 85025 COMPLETE CBC W/AUTO DIFF WBC: CPT | Performed by: STUDENT IN AN ORGANIZED HEALTH CARE EDUCATION/TRAINING PROGRAM

## 2021-09-03 PROCEDURE — 93005 ELECTROCARDIOGRAM TRACING: CPT | Performed by: STUDENT IN AN ORGANIZED HEALTH CARE EDUCATION/TRAINING PROGRAM

## 2021-09-03 PROCEDURE — 93010 ELECTROCARDIOGRAM REPORT: CPT | Performed by: INTERNAL MEDICINE

## 2021-09-03 RX ADMIN — SODIUM CHLORIDE, PRESERVATIVE FREE 10 ML: 5 INJECTION INTRAVENOUS at 22:13

## 2021-09-03 RX ADMIN — DEXAMETHASONE SODIUM PHOSPHATE 10 MG: 10 INJECTION INTRAMUSCULAR; INTRAVENOUS at 01:43

## 2021-09-03 RX ADMIN — DEXAMETHASONE SODIUM PHOSPHATE 10 MG: 10 INJECTION INTRAMUSCULAR; INTRAVENOUS at 14:29

## 2021-09-03 RX ADMIN — ENOXAPARIN SODIUM 40 MG: 40 INJECTION SUBCUTANEOUS at 22:12

## 2021-09-03 RX ADMIN — SODIUM CHLORIDE, PRESERVATIVE FREE 10 ML: 5 INJECTION INTRAVENOUS at 11:13

## 2021-09-03 RX ADMIN — Medication 30 ML: at 11:11

## 2021-09-03 RX ADMIN — Medication 30 ML: at 22:13

## 2021-09-03 RX ADMIN — SODIUM CHLORIDE, PRESERVATIVE FREE 10 ML: 5 INJECTION INTRAVENOUS at 14:29

## 2021-09-03 NOTE — PLAN OF CARE
Goal Outcome Evaluation:  Plan of Care Reviewed With: patient     Encouraged use of incentive spirometer. Patient continues to require significant amount of supplemental oxygen to maintain adequate oxygen saturation.

## 2021-09-03 NOTE — PLAN OF CARE
Goal Outcome Evaluation:  Plan of Care Reviewed With: patient        Progress: improving  Outcome Summary: NO ACUTE EVENTS NOTED.  SAFETY PRECAUTIONS ONGOING.  WILL CONT. TO MONITOR

## 2021-09-03 NOTE — PROGRESS NOTES
[facility]    Name:  Alberto Torres   Age:  51 y.o.  Sex:  male  :  1970  MRN:  [id number]   Visit Number:  [id number]  Admission Date:  2021  Date Of Service:  21  Primary Care Physician:  Yajaira Saleh APRN     LOS: 3 days :    Chief Complaint:      Shortness of breath, COVID-19 Pneumonia    Subjective:    Patient seen and examined at bedside this morning.  Chart reviewed and events noted.  He states that he feels as if his breathing has improved somewhat. He continues to use his incentive spirometer and is sitting in bedside chair as much as possible. His only complaint today is that of being bored. Denies chest pain or palpitations.    Hospital Course:    Mr. Torres is a 51-year-old male with no significant past medical history that presents to the hospital with complaints of worsening shortness of breath. Was diagnosed with COVID on 21. Continued to have worsening shortness of breath and upon arrival to the ED was found to be hypoxic with oxygen saturation of 80% on 10L. He was placed on 30L high flow nasal cannula, 100% FiO2 which improved oxygen saturation to 98%. He was then admitted to the hospital and started on Dexamethasone 10mg bid.    Review of Systems:     All systems were reviewed and negative except as mentioned in subjective, assessment and plan.    Vital Signs:    Temp:  [97 °F (36.1 °C)-98.4 °F (36.9 °C)] 97.2 °F (36.2 °C)  Heart Rate:  [41-65] 49  Resp:  [18-20] 20  BP: (122-134)/(69-80) 131/77    Intake and output:    I/O last 3 completed shifts:  In: 240 [P.O.:240]  Out: 3050 [Urine:3050]  I/O this shift:  In: 440 [P.O.:440]  Out: 200 [Urine:200]    Physical Examination:    General Appearance:  Alert and cooperative. Sitting in bedside chair. In NAD.   Head:  Atraumatic and normocephalic.    Eyes: Conjunctivae and sclerae normal, no icterus. No pallor.   Throat: No oral lesions, no thrush, oral mucosa moist.   Neck:  Supple, trachea midline, no thyromegaly.   Lungs:   Breath sounds diffusely diminished bilaterally with scattered ronchi throughout. No wheezing.   Heart:  Normal S1 and S2, no murmur, no gallop, no rub. No JVD.   Abdomen:   Normal bowel sounds, no masses, no organomegaly. Soft, nontender, nondistended, no rebound tenderness.   Extremities: Supple, no edema, no cyanosis, no clubbing.   Skin: No bleeding or rash.   Neurologic: Alert and oriented x 3. No facial asymmetry. Moves all four limbs. No tremors.      Laboratory results:    Results from last 7 days   Lab Units 09/03/21  0605 09/02/21  0543 09/01/21  0624 08/31/21  1310 08/31/21  1310   SODIUM mmol/L 137 136 137   < > 140   POTASSIUM mmol/L 4.4 4.7 4.5   < > 4.4   CHLORIDE mmol/L 101 103 103   < > 100   CO2 mmol/L 25.2 24.6 23.4   < > 25.2   BUN mg/dL 19 20 18   < > 19   CREATININE mg/dL 0.71* 0.75* 0.79   < > 1.14   CALCIUM mg/dL 8.9 8.7 8.3*   < > 9.0   BILIRUBIN mg/dL  --   --   --   --  0.5   ALK PHOS U/L  --   --   --   --  72   ALT (SGPT) U/L  --   --   --   --  54*   AST (SGOT) U/L  --   --   --   --  100*   GLUCOSE mg/dL 132* 144* 149*   < > 133*    < > = values in this interval not displayed.     Results from last 7 days   Lab Units 09/03/21  0605 09/02/21  0543 09/01/21  0624   WBC 10*3/mm3 8.18 9.32 7.36   HEMOGLOBIN g/dL 14.2 14.3 14.8   HEMATOCRIT % 40.9 40.7 43.0   PLATELETS 10*3/mm3 364 327 278         Results from last 7 days   Lab Units 08/31/21  1310   TROPONIN T ng/mL <0.010     Results from last 7 days   Lab Units 08/31/21  1333   BLOODCX  No growth at 3 days  No growth at 3 days         I have reviewed the patient's laboratory results.    Radiology results:    No radiology results from the last 24 hrs  I have reviewed the patient's radiology reports.    Medication Review:     I have reviewed the patient's active and prn medications.     Problem List:      COVID-19      Assessment:    1. Acute Hypoxic Respiratory Failure 2/2 Bilateral  COVID-19 Pneumonia, POA  2. Severe, Bilateral COVID-19 Pneumonia, POA  3. Elevated D-dimer  4. Lactic Acidosis, Improved    Plan:    - Patient continues to require high flow nasal cannula to maintain oxygen saturation greater than 90%   - Continue Dexamethasone 10mg bid  - Patient is outside of the window for Remdesivir administration  - CT chest revealed no PE  - Procalcitonin negative. I have a low suspicion for superimposed bacterial pneumonia and therefore will hold off on antibiotics.  - Albuterol inhaler  - Continuous pulse oximetry  - PT/OT  - Incentive spirometry  - Continues to remain high risk for decompensation, requiring intubation. Patient is agreeable if needed.    DVT Prophylaxis: Lovenox  Code Status: Full  Diet: Regular  Discharge Plan: To be determined. Likely greater than 72 hours.    Hue Villalobos DO  09/03/21  17:21 EDT    Dictated utilizing Dragon dictation.

## 2021-09-04 LAB
ANION GAP SERPL CALCULATED.3IONS-SCNC: 10.5 MMOL/L (ref 5–15)
BASOPHILS # BLD AUTO: 0.02 10*3/MM3 (ref 0–0.2)
BASOPHILS NFR BLD AUTO: 0.2 % (ref 0–1.5)
BUN SERPL-MCNC: 18 MG/DL (ref 6–20)
BUN/CREAT SERPL: 25.7 (ref 7–25)
CALCIUM SPEC-SCNC: 8.9 MG/DL (ref 8.6–10.5)
CHLORIDE SERPL-SCNC: 101 MMOL/L (ref 98–107)
CO2 SERPL-SCNC: 24.5 MMOL/L (ref 22–29)
CREAT SERPL-MCNC: 0.7 MG/DL (ref 0.76–1.27)
DEPRECATED RDW RBC AUTO: 39 FL (ref 37–54)
EOSINOPHIL # BLD AUTO: 0.02 10*3/MM3 (ref 0–0.4)
EOSINOPHIL NFR BLD AUTO: 0.2 % (ref 0.3–6.2)
ERYTHROCYTE [DISTWIDTH] IN BLOOD BY AUTOMATED COUNT: 11.7 % (ref 12.3–15.4)
GFR SERPL CREATININE-BSD FRML MDRD: 119 ML/MIN/1.73
GLUCOSE SERPL-MCNC: 129 MG/DL (ref 65–99)
HCT VFR BLD AUTO: 42.3 % (ref 37.5–51)
HGB BLD-MCNC: 14.9 G/DL (ref 13–17.7)
IMM GRANULOCYTES # BLD AUTO: 0.13 10*3/MM3 (ref 0–0.05)
IMM GRANULOCYTES NFR BLD AUTO: 1.6 % (ref 0–0.5)
LYMPHOCYTES # BLD AUTO: 0.73 10*3/MM3 (ref 0.7–3.1)
LYMPHOCYTES NFR BLD AUTO: 8.8 % (ref 19.6–45.3)
MCH RBC QN AUTO: 32 PG (ref 26.6–33)
MCHC RBC AUTO-ENTMCNC: 35.2 G/DL (ref 31.5–35.7)
MCV RBC AUTO: 91 FL (ref 79–97)
MONOCYTES # BLD AUTO: 0.4 10*3/MM3 (ref 0.1–0.9)
MONOCYTES NFR BLD AUTO: 4.8 % (ref 5–12)
NEUTROPHILS NFR BLD AUTO: 7.01 10*3/MM3 (ref 1.7–7)
NEUTROPHILS NFR BLD AUTO: 84.4 % (ref 42.7–76)
NRBC BLD AUTO-RTO: 0 /100 WBC (ref 0–0.2)
PLATELET # BLD AUTO: 389 10*3/MM3 (ref 140–450)
PMV BLD AUTO: 11 FL (ref 6–12)
POTASSIUM SERPL-SCNC: 4.3 MMOL/L (ref 3.5–5.2)
RBC # BLD AUTO: 4.65 10*6/MM3 (ref 4.14–5.8)
SODIUM SERPL-SCNC: 136 MMOL/L (ref 136–145)
WBC # BLD AUTO: 8.31 10*3/MM3 (ref 3.4–10.8)

## 2021-09-04 PROCEDURE — 99231 SBSQ HOSP IP/OBS SF/LOW 25: CPT | Performed by: STUDENT IN AN ORGANIZED HEALTH CARE EDUCATION/TRAINING PROGRAM

## 2021-09-04 PROCEDURE — 80048 BASIC METABOLIC PNL TOTAL CA: CPT | Performed by: STUDENT IN AN ORGANIZED HEALTH CARE EDUCATION/TRAINING PROGRAM

## 2021-09-04 PROCEDURE — 85025 COMPLETE CBC W/AUTO DIFF WBC: CPT | Performed by: STUDENT IN AN ORGANIZED HEALTH CARE EDUCATION/TRAINING PROGRAM

## 2021-09-04 PROCEDURE — 94799 UNLISTED PULMONARY SVC/PX: CPT

## 2021-09-04 PROCEDURE — 25010000002 ENOXAPARIN PER 10 MG: Performed by: STUDENT IN AN ORGANIZED HEALTH CARE EDUCATION/TRAINING PROGRAM

## 2021-09-04 PROCEDURE — 25010000002 DEXAMETHASONE PER 1 MG: Performed by: FAMILY MEDICINE

## 2021-09-04 RX ORDER — BISACODYL 5 MG/1
5 TABLET, DELAYED RELEASE ORAL DAILY PRN
Status: DISCONTINUED | OUTPATIENT
Start: 2021-09-04 | End: 2021-09-11 | Stop reason: HOSPADM

## 2021-09-04 RX ORDER — POLYETHYLENE GLYCOL 3350 17 G/17G
17 POWDER, FOR SOLUTION ORAL DAILY PRN
Status: DISCONTINUED | OUTPATIENT
Start: 2021-09-04 | End: 2021-09-11 | Stop reason: HOSPADM

## 2021-09-04 RX ORDER — BISACODYL 10 MG
10 SUPPOSITORY, RECTAL RECTAL DAILY PRN
Status: DISCONTINUED | OUTPATIENT
Start: 2021-09-04 | End: 2021-09-11 | Stop reason: HOSPADM

## 2021-09-04 RX ORDER — AMOXICILLIN 250 MG
2 CAPSULE ORAL 2 TIMES DAILY
Status: DISCONTINUED | OUTPATIENT
Start: 2021-09-04 | End: 2021-09-11 | Stop reason: HOSPADM

## 2021-09-04 RX ADMIN — DOCUSATE SODIUM 50MG AND SENNOSIDES 8.6MG 2 TABLET: 8.6; 5 TABLET, FILM COATED ORAL at 15:58

## 2021-09-04 RX ADMIN — Medication 30 ML: at 10:16

## 2021-09-04 RX ADMIN — SODIUM CHLORIDE, PRESERVATIVE FREE 10 ML: 5 INJECTION INTRAVENOUS at 20:06

## 2021-09-04 RX ADMIN — DEXAMETHASONE SODIUM PHOSPHATE 10 MG: 10 INJECTION INTRAMUSCULAR; INTRAVENOUS at 15:58

## 2021-09-04 RX ADMIN — Medication 30 ML: at 20:04

## 2021-09-04 RX ADMIN — SODIUM CHLORIDE, PRESERVATIVE FREE 10 ML: 5 INJECTION INTRAVENOUS at 10:16

## 2021-09-04 RX ADMIN — DEXAMETHASONE SODIUM PHOSPHATE 10 MG: 10 INJECTION INTRAMUSCULAR; INTRAVENOUS at 01:56

## 2021-09-04 RX ADMIN — DOCUSATE SODIUM 50MG AND SENNOSIDES 8.6MG 2 TABLET: 8.6; 5 TABLET, FILM COATED ORAL at 20:02

## 2021-09-04 RX ADMIN — ENOXAPARIN SODIUM 40 MG: 40 INJECTION SUBCUTANEOUS at 20:02

## 2021-09-04 NOTE — PROGRESS NOTES
[facility]    Name:  Alberto Torres   Age:  51 y.o.  Sex:  male  :  1970  MRN:  [id number]   Visit Number:  [id number]  Admission Date:  2021  Date Of Service:  21  Primary Care Physician:  Yajaira Saleh APRN     LOS: 4 days :    Chief Complaint:      Shortness of breath, COVID-19 Pneumonia    Subjective:    Patient seen and examined at bedside this morning.  Chart reviewed and events noted.  He continues to feel as if he is improving, day by day. He is using his incentive spirometer and is sitting in bedside chair as much as possible. Denies chest pain or palpitations.    Hospital Course:    Mr. Torres is a 51-year-old male with no significant past medical history that presents to the hospital with complaints of worsening shortness of breath. Was diagnosed with COVID on 21. Continued to have worsening shortness of breath and upon arrival to the ED was found to be hypoxic with oxygen saturation of 80% on 10L. He was placed on 30L high flow nasal cannula, 100% FiO2 which improved oxygen saturation to 98%. He was then admitted to the hospital and started on Dexamethasone 10mg bid.    Review of Systems:     All systems were reviewed and negative except as mentioned in subjective, assessment and plan.    Vital Signs:    Temp:  [97.1 °F (36.2 °C)-98.8 °F (37.1 °C)] 98.8 °F (37.1 °C)  Heart Rate:  [39-65] 55  Resp:  [20] 20  BP: (119-134)/(74-92) 126/76    Intake and output:    I/O last 3 completed shifts:  In: 920 [P.O.:920]  Out: 2600 [Urine:2600]  I/O this shift:  In: 240 [P.O.:240]  Out: 350 [Urine:350]    Physical Examination:    General Appearance:  Alert and cooperative. Sitting in bedside chair. In NAD.   Head:  Atraumatic and normocephalic.    Eyes: Conjunctivae and sclerae normal, no icterus. No pallor.   Throat: No oral lesions, no thrush, oral mucosa moist.   Neck: Supple, trachea midline, no thyromegaly.   Lungs:   Breath sounds  diffusely diminished bilaterally with scattered ronchi throughout. No wheezing.   Heart:  Normal S1 and S2, no murmur, no gallop, no rub. No JVD.   Abdomen:   Normal bowel sounds, no masses, no organomegaly. Soft, nontender, nondistended, no rebound tenderness.   Extremities: Supple, no edema, no cyanosis, no clubbing.   Skin: No bleeding or rash.   Neurologic: Alert and oriented x 3. No facial asymmetry. Moves all four limbs. No tremors.      Laboratory results:    Results from last 7 days   Lab Units 09/04/21  0614 09/03/21  0605 09/02/21  0543 09/01/21  0624 08/31/21  1310   SODIUM mmol/L 136 137 136   < > 140   POTASSIUM mmol/L 4.3 4.4 4.7   < > 4.4   CHLORIDE mmol/L 101 101 103   < > 100   CO2 mmol/L 24.5 25.2 24.6   < > 25.2   BUN mg/dL 18 19 20   < > 19   CREATININE mg/dL 0.70* 0.71* 0.75*   < > 1.14   CALCIUM mg/dL 8.9 8.9 8.7   < > 9.0   BILIRUBIN mg/dL  --   --   --   --  0.5   ALK PHOS U/L  --   --   --   --  72   ALT (SGPT) U/L  --   --   --   --  54*   AST (SGOT) U/L  --   --   --   --  100*   GLUCOSE mg/dL 129* 132* 144*   < > 133*    < > = values in this interval not displayed.     Results from last 7 days   Lab Units 09/04/21  0614 09/03/21  0605 09/02/21  0543   WBC 10*3/mm3 8.31 8.18 9.32   HEMOGLOBIN g/dL 14.9 14.2 14.3   HEMATOCRIT % 42.3 40.9 40.7   PLATELETS 10*3/mm3 389 364 327         Results from last 7 days   Lab Units 08/31/21  1310   TROPONIN T ng/mL <0.010     Results from last 7 days   Lab Units 08/31/21  1333   BLOODCX  No growth at 4 days  No growth at 4 days         I have reviewed the patient's laboratory results.    Radiology results:    No radiology results from the last 24 hrs  I have reviewed the patient's radiology reports.    Medication Review:     I have reviewed the patient's active and prn medications.     Problem List:      COVID-19      Assessment:    1. Acute Hypoxic Respiratory Failure 2/2 Bilateral COVID-19 Pneumonia, POA  2. Severe, Bilateral COVID-19 Pneumonia,  POA  3. Elevated D-dimer  4. Lactic Acidosis, Improved    Plan:    - Patient continues to require 40L/75% FiO2 high flow nasal cannula to maintain oxygen saturation greater than 90%   - Continue Dexamethasone 10mg bid  - Patient is outside of the window for Remdesivir administration  - CT chest revealed no PE  - Procalcitonin negative. I have a low suspicion for superimposed bacterial pneumonia and therefore will hold off on antibiotics.  - Albuterol inhaler  - Continuous pulse oximetry  - PT/OT  - Incentive spirometry  - Continues to remain high risk for decompensation, requiring intubation. Patient is agreeable if needed.    DVT Prophylaxis: Lovenox  Code Status: Full  Diet: Regular  Discharge Plan: To be determined. Likely greater than 72 hours.    Hue Villalobos DO  09/04/21  16:28 EDT    Dictated utilizing Dragon dictation.

## 2021-09-05 LAB
ANION GAP SERPL CALCULATED.3IONS-SCNC: 10.9 MMOL/L (ref 5–15)
BACTERIA SPEC AEROBE CULT: NORMAL
BACTERIA SPEC AEROBE CULT: NORMAL
BASOPHILS # BLD AUTO: 0.02 10*3/MM3 (ref 0–0.2)
BASOPHILS NFR BLD AUTO: 0.3 % (ref 0–1.5)
BUN SERPL-MCNC: 18 MG/DL (ref 6–20)
BUN/CREAT SERPL: 24 (ref 7–25)
CALCIUM SPEC-SCNC: 9 MG/DL (ref 8.6–10.5)
CHLORIDE SERPL-SCNC: 100 MMOL/L (ref 98–107)
CO2 SERPL-SCNC: 23.1 MMOL/L (ref 22–29)
CREAT SERPL-MCNC: 0.75 MG/DL (ref 0.76–1.27)
DEPRECATED RDW RBC AUTO: 38.2 FL (ref 37–54)
EOSINOPHIL # BLD AUTO: 0.1 10*3/MM3 (ref 0–0.4)
EOSINOPHIL NFR BLD AUTO: 1.4 % (ref 0.3–6.2)
ERYTHROCYTE [DISTWIDTH] IN BLOOD BY AUTOMATED COUNT: 11.6 % (ref 12.3–15.4)
GFR SERPL CREATININE-BSD FRML MDRD: 110 ML/MIN/1.73
GLUCOSE SERPL-MCNC: 119 MG/DL (ref 65–99)
HCT VFR BLD AUTO: 43.7 % (ref 37.5–51)
HGB BLD-MCNC: 15.7 G/DL (ref 13–17.7)
IMM GRANULOCYTES # BLD AUTO: 0.13 10*3/MM3 (ref 0–0.05)
IMM GRANULOCYTES NFR BLD AUTO: 1.8 % (ref 0–0.5)
LYMPHOCYTES # BLD AUTO: 0.74 10*3/MM3 (ref 0.7–3.1)
LYMPHOCYTES NFR BLD AUTO: 10.5 % (ref 19.6–45.3)
MCH RBC QN AUTO: 32.4 PG (ref 26.6–33)
MCHC RBC AUTO-ENTMCNC: 35.9 G/DL (ref 31.5–35.7)
MCV RBC AUTO: 90.1 FL (ref 79–97)
MONOCYTES # BLD AUTO: 0.32 10*3/MM3 (ref 0.1–0.9)
MONOCYTES NFR BLD AUTO: 4.5 % (ref 5–12)
NEUTROPHILS NFR BLD AUTO: 5.76 10*3/MM3 (ref 1.7–7)
NEUTROPHILS NFR BLD AUTO: 81.5 % (ref 42.7–76)
NRBC BLD AUTO-RTO: 0 /100 WBC (ref 0–0.2)
PLATELET # BLD AUTO: 432 10*3/MM3 (ref 140–450)
PMV BLD AUTO: 11 FL (ref 6–12)
POTASSIUM SERPL-SCNC: 4.5 MMOL/L (ref 3.5–5.2)
PROCALCITONIN SERPL-MCNC: 0.16 NG/ML (ref 0–0.25)
RBC # BLD AUTO: 4.85 10*6/MM3 (ref 4.14–5.8)
SODIUM SERPL-SCNC: 134 MMOL/L (ref 136–145)
WBC # BLD AUTO: 7.07 10*3/MM3 (ref 3.4–10.8)

## 2021-09-05 PROCEDURE — 25010000002 ENOXAPARIN PER 10 MG: Performed by: STUDENT IN AN ORGANIZED HEALTH CARE EDUCATION/TRAINING PROGRAM

## 2021-09-05 PROCEDURE — 94799 UNLISTED PULMONARY SVC/PX: CPT

## 2021-09-05 PROCEDURE — 85025 COMPLETE CBC W/AUTO DIFF WBC: CPT | Performed by: STUDENT IN AN ORGANIZED HEALTH CARE EDUCATION/TRAINING PROGRAM

## 2021-09-05 PROCEDURE — 80048 BASIC METABOLIC PNL TOTAL CA: CPT | Performed by: STUDENT IN AN ORGANIZED HEALTH CARE EDUCATION/TRAINING PROGRAM

## 2021-09-05 PROCEDURE — 99232 SBSQ HOSP IP/OBS MODERATE 35: CPT | Performed by: STUDENT IN AN ORGANIZED HEALTH CARE EDUCATION/TRAINING PROGRAM

## 2021-09-05 PROCEDURE — 84145 PROCALCITONIN (PCT): CPT | Performed by: STUDENT IN AN ORGANIZED HEALTH CARE EDUCATION/TRAINING PROGRAM

## 2021-09-05 PROCEDURE — 25010000002 DEXAMETHASONE PER 1 MG: Performed by: FAMILY MEDICINE

## 2021-09-05 RX ORDER — CHOLECALCIFEROL (VITAMIN D3) 125 MCG
10 CAPSULE ORAL NIGHTLY
Status: DISCONTINUED | OUTPATIENT
Start: 2021-09-05 | End: 2021-09-11 | Stop reason: HOSPADM

## 2021-09-05 RX ADMIN — DEXAMETHASONE SODIUM PHOSPHATE 10 MG: 10 INJECTION INTRAMUSCULAR; INTRAVENOUS at 14:12

## 2021-09-05 RX ADMIN — ENOXAPARIN SODIUM 40 MG: 40 INJECTION SUBCUTANEOUS at 20:10

## 2021-09-05 RX ADMIN — SERTRALINE HYDROCHLORIDE 25 MG: 50 TABLET ORAL at 20:12

## 2021-09-05 RX ADMIN — Medication 30 ML: at 08:38

## 2021-09-05 RX ADMIN — DEXAMETHASONE SODIUM PHOSPHATE 10 MG: 10 INJECTION INTRAMUSCULAR; INTRAVENOUS at 01:13

## 2021-09-05 RX ADMIN — SODIUM CHLORIDE, PRESERVATIVE FREE 10 ML: 5 INJECTION INTRAVENOUS at 08:38

## 2021-09-05 RX ADMIN — Medication 10 MG: at 20:12

## 2021-09-05 RX ADMIN — Medication 30 ML: at 20:14

## 2021-09-05 RX ADMIN — DOCUSATE SODIUM 50MG AND SENNOSIDES 8.6MG 2 TABLET: 8.6; 5 TABLET, FILM COATED ORAL at 08:38

## 2021-09-05 RX ADMIN — SODIUM CHLORIDE, PRESERVATIVE FREE 10 ML: 5 INJECTION INTRAVENOUS at 20:14

## 2021-09-05 NOTE — PLAN OF CARE
Goal Outcome Evaluation:           Progress: no change  Outcome Summary: Patient slept on and off throught the night with no complaints. No oxygen increased this shift. vitals have remained stable and will continue to monitor. no overnight events to report.

## 2021-09-05 NOTE — PROGRESS NOTES
"    [facility]    PROGRESS NOTE    Name:  Alberto Torres   Age:  51 y.o.  Sex:  male  :  1970  MRN:  [id number]   Visit Number:  [id number]  Admission Date:  2021  Date Of Service:  21  Primary Care Physician:  Yajaira Saleh APRN     LOS: 5 days :    Chief Complaint:      Shortness of breath, COVID-19 Pneumonia    Subjective:    Patient seen and examined at bedside this morning.  Chart reviewed and events noted.  He is lying in bed in a dark room with the blinds pulled all the way down.  He states that he has been feeling very down, because he feels like he has not had any improvement.  He says that he is just worn out from lying in bed and \"just trying to keep breathing.\" Denies chest pain or palpitations.    Hospital Course:    Mr. Torres is a 51-year-old male with no significant past medical history that presents to the hospital with complaints of worsening shortness of breath. Was diagnosed with COVID on 21. Continued to have worsening shortness of breath and upon arrival to the ED was found to be hypoxic with oxygen saturation of 80% on 10L. He was placed on 30L high flow nasal cannula, 100% FiO2 which improved oxygen saturation to 98%. He was then admitted to the hospital and started on Dexamethasone 10mg bid.    Review of Systems:     All systems were reviewed and negative except as mentioned in subjective, assessment and plan.    Vital Signs:    Temp:  [96.7 °F (35.9 °C)-97.7 °F (36.5 °C)] 97.7 °F (36.5 °C)  Heart Rate:  [49-74] 67  Resp:  [20-22] 20  BP: (102-130)/(58-86) 107/58    Intake and output:    I/O last 3 completed shifts:  In: 440 [P.O.:440]  Out: 2350 [Urine:2350]  I/O this shift:  In: 240 [P.O.:240]  Out: 800 [Urine:800]    Physical Examination:    General Appearance:  Alert and cooperative. Lying in bed. In NAD.   Head:  Atraumatic and normocephalic.    Eyes: Conjunctivae and sclerae normal, no icterus. No pallor.   Throat: No oral lesions, " no thrush, oral mucosa moist.   Neck: Supple, trachea midline, no thyromegaly.   Lungs:   Breath sounds diffusely diminished bilaterally with scattered ronchi throughout. No wheezing.   Heart:  Normal S1 and S2, no murmur, no gallop, no rub. No JVD.   Abdomen:   Normal bowel sounds, no masses, no organomegaly. Soft, nontender, nondistended, no rebound tenderness.   Extremities: Supple, no edema, no cyanosis, no clubbing.   Skin: No bleeding or rash.   Neurologic: Alert and oriented x 3. No facial asymmetry. Moves all four limbs. No tremors. Depressed affect.     Laboratory results:    Results from last 7 days   Lab Units 09/05/21  0637 09/04/21  0614 09/03/21  0605 09/01/21  0624 08/31/21  1310   SODIUM mmol/L 134* 136 137   < > 140   POTASSIUM mmol/L 4.5 4.3 4.4   < > 4.4   CHLORIDE mmol/L 100 101 101   < > 100   CO2 mmol/L 23.1 24.5 25.2   < > 25.2   BUN mg/dL 18 18 19   < > 19   CREATININE mg/dL 0.75* 0.70* 0.71*   < > 1.14   CALCIUM mg/dL 9.0 8.9 8.9   < > 9.0   BILIRUBIN mg/dL  --   --   --   --  0.5   ALK PHOS U/L  --   --   --   --  72   ALT (SGPT) U/L  --   --   --   --  54*   AST (SGOT) U/L  --   --   --   --  100*   GLUCOSE mg/dL 119* 129* 132*   < > 133*    < > = values in this interval not displayed.     Results from last 7 days   Lab Units 09/05/21  0637 09/04/21  0614 09/03/21  0605   WBC 10*3/mm3 7.07 8.31 8.18   HEMOGLOBIN g/dL 15.7 14.9 14.2   HEMATOCRIT % 43.7 42.3 40.9   PLATELETS 10*3/mm3 432 389 364         Results from last 7 days   Lab Units 08/31/21  1310   TROPONIN T ng/mL <0.010     Results from last 7 days   Lab Units 08/31/21  1333   BLOODCX  No growth at 5 days  No growth at 5 days         I have reviewed the patient's laboratory results.    Radiology results:    No radiology results from the last 24 hrs  I have reviewed the patient's radiology reports.    Medication Review:     I have reviewed the patient's active and prn medications.     Problem List:       COVID-19      Assessment:    1. Acute Hypoxic Respiratory Failure 2/2 Bilateral COVID-19 Pneumonia, POA  2. Severe, Bilateral COVID-19 Pneumonia, POA  3. Elevated D-dimer  4. Lactic Acidosis, Improved    Plan:    - Patient is now requiring 50L/80% FiO2 high flow nasal cannula to maintain oxygen saturation greater than 90%   - Continue Dexamethasone 10mg bid  - Patient is outside of the window for Remdesivir administration  - CT chest revealed no PE  - Procalcitonin negative. I have a low suspicion for superimposed bacterial pneumonia and therefore will hold off on antibiotics.  - Albuterol inhaler  - Continuous pulse oximetry  - PT/OT  - Incentive spirometry  - Will start low dose SSRI for depression  - Continues to remain high risk for decompensation, requiring intubation. Patient is agreeable if needed.    DVT Prophylaxis: Lovenox  Code Status: Full  Diet: Regular  Discharge Plan: To be determined. Likely greater than 72 hours.    Hue Villalobos, DO  09/05/21  16:59 EDT    Dictated utilizing Dragon dictation.

## 2021-09-05 NOTE — NURSING NOTE
Patient's oxygen requirements increased to 60L/100% airvo to maintain O2 saturations >88%. Patient denies shortness of breath at rest but reports significant shortness of breath with any activity. Encouraged incentive spirometer use and ambulating as tolerated. Will continue to monitor.

## 2021-09-05 NOTE — PLAN OF CARE
Goal Outcome Evaluation:           Progress: no change  Outcome Summary: Oxygen increased to 60L/80% airvo to maintain O2 saturations >88%. Patient now 88-90%. Patient reports shortness of breath with any activity. Sinus shyla on telemetry.

## 2021-09-06 LAB
ANION GAP SERPL CALCULATED.3IONS-SCNC: 11.2 MMOL/L (ref 5–15)
BASOPHILS # BLD AUTO: 0.03 10*3/MM3 (ref 0–0.2)
BASOPHILS NFR BLD AUTO: 0.4 % (ref 0–1.5)
BUN SERPL-MCNC: 21 MG/DL (ref 6–20)
BUN/CREAT SERPL: 28.4 (ref 7–25)
CALCIUM SPEC-SCNC: 8.9 MG/DL (ref 8.6–10.5)
CHLORIDE SERPL-SCNC: 100 MMOL/L (ref 98–107)
CO2 SERPL-SCNC: 22.8 MMOL/L (ref 22–29)
CREAT SERPL-MCNC: 0.74 MG/DL (ref 0.76–1.27)
DEPRECATED RDW RBC AUTO: 38 FL (ref 37–54)
EOSINOPHIL # BLD AUTO: 0.13 10*3/MM3 (ref 0–0.4)
EOSINOPHIL NFR BLD AUTO: 1.9 % (ref 0.3–6.2)
ERYTHROCYTE [DISTWIDTH] IN BLOOD BY AUTOMATED COUNT: 11.7 % (ref 12.3–15.4)
GFR SERPL CREATININE-BSD FRML MDRD: 112 ML/MIN/1.73
GLUCOSE SERPL-MCNC: 131 MG/DL (ref 65–99)
HCT VFR BLD AUTO: 43.5 % (ref 37.5–51)
HGB BLD-MCNC: 15.4 G/DL (ref 13–17.7)
IMM GRANULOCYTES # BLD AUTO: 0.11 10*3/MM3 (ref 0–0.05)
IMM GRANULOCYTES NFR BLD AUTO: 1.6 % (ref 0–0.5)
LYMPHOCYTES # BLD AUTO: 0.67 10*3/MM3 (ref 0.7–3.1)
LYMPHOCYTES NFR BLD AUTO: 9.9 % (ref 19.6–45.3)
MCH RBC QN AUTO: 31.9 PG (ref 26.6–33)
MCHC RBC AUTO-ENTMCNC: 35.4 G/DL (ref 31.5–35.7)
MCV RBC AUTO: 90.1 FL (ref 79–97)
MONOCYTES # BLD AUTO: 0.24 10*3/MM3 (ref 0.1–0.9)
MONOCYTES NFR BLD AUTO: 3.6 % (ref 5–12)
NEUTROPHILS NFR BLD AUTO: 5.56 10*3/MM3 (ref 1.7–7)
NEUTROPHILS NFR BLD AUTO: 82.6 % (ref 42.7–76)
NRBC BLD AUTO-RTO: 0 /100 WBC (ref 0–0.2)
PLATELET # BLD AUTO: 415 10*3/MM3 (ref 140–450)
PMV BLD AUTO: 11.1 FL (ref 6–12)
POTASSIUM SERPL-SCNC: 4.7 MMOL/L (ref 3.5–5.2)
RBC # BLD AUTO: 4.83 10*6/MM3 (ref 4.14–5.8)
SODIUM SERPL-SCNC: 134 MMOL/L (ref 136–145)
WBC # BLD AUTO: 6.74 10*3/MM3 (ref 3.4–10.8)

## 2021-09-06 PROCEDURE — 93010 ELECTROCARDIOGRAM REPORT: CPT | Performed by: INTERNAL MEDICINE

## 2021-09-06 PROCEDURE — 85025 COMPLETE CBC W/AUTO DIFF WBC: CPT | Performed by: STUDENT IN AN ORGANIZED HEALTH CARE EDUCATION/TRAINING PROGRAM

## 2021-09-06 PROCEDURE — 94799 UNLISTED PULMONARY SVC/PX: CPT

## 2021-09-06 PROCEDURE — 25010000002 FUROSEMIDE PER 20 MG: Performed by: EMERGENCY MEDICINE

## 2021-09-06 PROCEDURE — 25010000002 ENOXAPARIN PER 10 MG: Performed by: EMERGENCY MEDICINE

## 2021-09-06 PROCEDURE — 63710000001 DEXAMETHASONE PER 0.25 MG: Performed by: EMERGENCY MEDICINE

## 2021-09-06 PROCEDURE — 25010000002 DEXAMETHASONE PER 1 MG: Performed by: FAMILY MEDICINE

## 2021-09-06 PROCEDURE — 99233 SBSQ HOSP IP/OBS HIGH 50: CPT | Performed by: EMERGENCY MEDICINE

## 2021-09-06 PROCEDURE — 80048 BASIC METABOLIC PNL TOTAL CA: CPT | Performed by: STUDENT IN AN ORGANIZED HEALTH CARE EDUCATION/TRAINING PROGRAM

## 2021-09-06 PROCEDURE — 97161 PT EVAL LOW COMPLEX 20 MIN: CPT

## 2021-09-06 PROCEDURE — 84443 ASSAY THYROID STIM HORMONE: CPT | Performed by: EMERGENCY MEDICINE

## 2021-09-06 PROCEDURE — 93005 ELECTROCARDIOGRAM TRACING: CPT | Performed by: EMERGENCY MEDICINE

## 2021-09-06 PROCEDURE — 94660 CPAP INITIATION&MGMT: CPT

## 2021-09-06 RX ORDER — FAMOTIDINE 20 MG/1
20 TABLET, FILM COATED ORAL DAILY
Status: DISCONTINUED | OUTPATIENT
Start: 2021-09-06 | End: 2021-09-11 | Stop reason: HOSPADM

## 2021-09-06 RX ORDER — FUROSEMIDE 10 MG/ML
20 INJECTION INTRAMUSCULAR; INTRAVENOUS DAILY
Status: COMPLETED | OUTPATIENT
Start: 2021-09-06 | End: 2021-09-10

## 2021-09-06 RX ADMIN — FAMOTIDINE 20 MG: 20 TABLET, FILM COATED ORAL at 13:00

## 2021-09-06 RX ADMIN — Medication 30 ML: at 08:54

## 2021-09-06 RX ADMIN — DEXAMETHASONE SODIUM PHOSPHATE 10 MG: 10 INJECTION INTRAMUSCULAR; INTRAVENOUS at 01:29

## 2021-09-06 RX ADMIN — Medication 10 MG: at 20:00

## 2021-09-06 RX ADMIN — Medication 30 ML: at 20:03

## 2021-09-06 RX ADMIN — FUROSEMIDE 20 MG: 10 INJECTION, SOLUTION INTRAMUSCULAR; INTRAVENOUS at 11:28

## 2021-09-06 RX ADMIN — DEXAMETHASONE 6 MG: 4 TABLET ORAL at 13:00

## 2021-09-06 RX ADMIN — SERTRALINE HYDROCHLORIDE 25 MG: 50 TABLET ORAL at 20:00

## 2021-09-06 RX ADMIN — Medication 30 ML: at 20:00

## 2021-09-06 RX ADMIN — SODIUM CHLORIDE, PRESERVATIVE FREE 10 ML: 5 INJECTION INTRAVENOUS at 20:00

## 2021-09-06 RX ADMIN — SODIUM CHLORIDE, PRESERVATIVE FREE 10 ML: 5 INJECTION INTRAVENOUS at 08:54

## 2021-09-06 RX ADMIN — ENOXAPARIN SODIUM 40 MG: 40 INJECTION SUBCUTANEOUS at 20:00

## 2021-09-06 RX ADMIN — DOCUSATE SODIUM 50MG AND SENNOSIDES 8.6MG 2 TABLET: 8.6; 5 TABLET, FILM COATED ORAL at 20:00

## 2021-09-06 NOTE — PROGRESS NOTES
[facility]    Name:  Alberto Torres   Age:  51 y.o.  Sex:  male  :  1970  MRN:  [id number]   Visit Number:  [id number]  Admission Date:  2021  Date Of Service:  21  Primary Care Physician:  Yajaira Saleh APRN     LOS: 6 days :    Chief Complaint:   Shortness of breath, COVID-19 Pneumonia    Subjective: He did not get vaccinated because he thought he was invincible.  He is still short of breath and coughing.  He is currently on 100% FiO2 60 L with Airvo.  I recommended CPAP and he is agreeable.    Hospital Course: 51 unvaccinated male who presented with c/o worsening shortness of breath. Diagnosed with COVID on 21.  In the ED, he had a SPO2 of 80% on 10L. He was placed on 30L high flow nasal cannula, 100% FiO2 which improved oxygen saturation to 98%. He was then admitted to the hospital and started on Dexamethasone.  Not a candidate for remdesivir given increased FiO2 requirement.    Review of Systems: All systems were reviewed and negative except as mentioned in subjective, assessment and plan.    Vital Signs:  Temp:  [96 °F (35.6 °C)-97.9 °F (36.6 °C)] 97.9 °F (36.6 °C)  Heart Rate:  [47-67] 61  Resp:  [20-22] 22  BP: (107-123)/(58-81) 112/80    Intake and output:  I/O last 3 completed shifts:  In: 600 [P.O.:600]  Out: 2975 [Urine:2975]  I/O this shift:  In: 360 [P.O.:360]  Out: 300 [Urine:300]    Physical Examination:  General: NAD, resting in bed  HEENT: EOMI, NC/AT  Heart: Bradycardic  Lungs: Labored breathing with accessory muscle usage  Abdomen: Soft, nondistended, nontender  Extremities: No edema  Neurological: A&O x3, moves all extremities  Psychological: Mood and affect appropriate, speech is coherent  Skin: warm, dry    Laboratory results:  Results from last 7 days   Lab Units 21  0606 21  0637 21  0614 21  0624 21  1310   SODIUM mmol/L 134* 134* 136   < > 140   POTASSIUM mmol/L 4.7 4.5 4.3   < > 4.4    CHLORIDE mmol/L 100 100 101   < > 100   CO2 mmol/L 22.8 23.1 24.5   < > 25.2   BUN mg/dL 21* 18 18   < > 19   CREATININE mg/dL 0.74* 0.75* 0.70*   < > 1.14   CALCIUM mg/dL 8.9 9.0 8.9   < > 9.0   BILIRUBIN mg/dL  --   --   --   --  0.5   ALK PHOS U/L  --   --   --   --  72   ALT (SGPT) U/L  --   --   --   --  54*   AST (SGOT) U/L  --   --   --   --  100*   GLUCOSE mg/dL 131* 119* 129*   < > 133*    < > = values in this interval not displayed.     Results from last 7 days   Lab Units 09/06/21  0606 09/05/21  0637 09/04/21  0614   WBC 10*3/mm3 6.74 7.07 8.31   HEMOGLOBIN g/dL 15.4 15.7 14.9   HEMATOCRIT % 43.5 43.7 42.3   PLATELETS 10*3/mm3 415 432 389         Results from last 7 days   Lab Units 08/31/21  1310   TROPONIN T ng/mL <0.010     Results from last 7 days   Lab Units 08/31/21  1333   BLOODCX  No growth at 5 days  No growth at 5 days   I have reviewed the patient's laboratory results.    Radiology results:No radiology results from the last 24 hrs  CT PE 8/31/2021: negative for PE     Medication Review: I have reviewed the patient's active and prn medications.     Problem List:  COVID-19    Assessment:  1. Acute Hypoxic Respiratory Failure   2. Severe COVID-19 Pneumonia, POA    Plan:  -Support on CPAP while resting and sleeping (RECOVERY-RS)  -Continue steroids  -Start low-dose daily diuretic  -Start antibiotics if procalcitonin elevated, hypertensive, or high clinical suspicion    Nathan Joseph DO  09/06/21  11:46 EDT    Dictated utilizing Dragon dictation.

## 2021-09-06 NOTE — PLAN OF CARE
Goal Outcome Evaluation:  Plan of Care Reviewed With: patient        Progress: no change  Outcome Summary: Patient participated well in PT evaluation and is able to maintain oxygen saturation at or above 87% with airvo.  Patient is able to demonstrate safe, independent functional mobility and does not require the skills of PT at this time.  He is able to demonstrate a good understanding of his oxygen saturation readings and mobility with sats above 86%.  He is instucted on the importance of mobility to recovery and demonstrates BLE exercises.  He understands that he can contact PT if his status changes.

## 2021-09-06 NOTE — THERAPY DISCHARGE NOTE
Patient Name: Alberto Torres  : 1970    MRN: [id number]                              Today's Date: 2021       Admit Date: 2021    Visit Dx:     ICD-10-CM ICD-9-CM   1. COVID-19  U07.1 079.89   2. Acute respiratory failure with hypoxia (CMS/[facility])  J96.01 518.81   3. Pneumonia due to COVID-19 virus  U07.1 480.8    J12.82 079.89     Patient Active Problem List   Diagnosis   • Depression   • Insomnia   • COVID-19     Past Medical History:   Diagnosis Date   • Impaired functional mobility, balance, gait, and endurance      History reviewed. No pertinent surgical history.  General Information     Row Name 21 1053          Physical Therapy Time and Intention    Document Type  discharge evaluation/summary  -     Mode of Treatment  physical therapy  -     Row Name 21 1053          General Information    Patient Profile Reviewed  yes  -JR     Prior Level of Function  independent:;community mobility  -JR     Existing Precautions/Restrictions  no known precautions/restrictions  -     Barriers to Rehab  none identified  -     Row Name 21 1053          Living Environment    Lives With  significant other  -     Row Name 21 1053          Home Main Entrance    Number of Stairs, Main Entrance  five  -     Row Name 21 1053          Stairs Within Home, Primary    Number of Stairs, Within Home, Primary  none  -JR     Row Name 21 1053          Cognition    Orientation Status (Cognition)  oriented x 4  -JR       User Key  (r) = Recorded By, (t) = Taken By, (c) = Cosigned By    Initials Name Provider Type    JR Alejandrina Spivey, PT Physical Therapist        Mobility     Row Name 21 1053          Bed Mobility    Bed Mobility  bed mobility (all) activities  -JR     All Activities, [place] (Bed Mobility)  independent  -     Row Name 21 1053          Sit-Stand Transfer    Sit-Stand [place] (Transfers)  independent  -     Row Name 21 1053           Gait/Stairs (Locomotion)    [place] Level (Gait)  independent  -     Distance in Feet (Gait)  24  -     Comment (Gait/Stairs)  limited by oxygen saturation decrease  -       User Key  (r) = Recorded By, (t) = Taken By, (c) = Cosigned By    Initials Name Provider Type    Alejandrina Sams, PT Physical Therapist        Obj/Interventions     Row Name 09/06/21 1053          Range of Motion Comprehensive    General Range of Motion  no range of motion deficits identified  -JR     Row Name 09/06/21 1053          Strength Comprehensive (MMT)    General Manual Muscle Testing (MMT) Assessment  no strength deficits identified  -JR     Row Name 09/06/21 1053          Motor Skills    Therapeutic Exercise  -- Patient is instructed on BLE exercies and the importance of maintaining oxygen saturation levels above 86%  -JR     Row Name 09/06/21 1053          Balance    Balance Assessment  sitting static balance;sitting dynamic balance;standing static balance;standing dynamic balance  -     Static Sitting Balance  WFL  -JR     Dynamic Sitting Balance  WFL  -JR     Static Standing Balance  WFL  -JR     Dynamic Standing Balance  WFL  -       User Key  (r) = Recorded By, (t) = Taken By, (c) = Cosigned By    Initials Name Provider Type    Alejandrina Sams, PT Physical Therapist        Goals/Plan    No documentation.       Clinical Impression     Row Name 09/06/21 1053          Pain    Additional Documentation  Pain Scale: Numbers Pre/Post-Treatment (Group)  -JR     Row Name 09/06/21 1053          Pain Scale: Numbers Pre/Post-Treatment    Pretreatment Pain Rating  0/10 - no pain  -     Posttreatment Pain Rating  0/10 - no pain  -JR     Row Name 09/06/21 1053          Plan of Care Review    Plan of Care Reviewed With  patient  -     Progress  no change  -     Outcome Summary  Patient participated well in PT evaluation and is able to maintain oxygen saturation at or above 87% with airvo.  Patient is able to demonstrate  safe, independent functional mobility and does not require the skills of PT at this time.  He is able to demonstrate a good understanding of his oxygen saturation readings and mobility with sats above 86%.  He is instucted on the importance of mobility to recovery and demonstrates BLE exercises.  He understands that he can contact PT if his status changes.  -     Row Name 09/06/21 1053          Therapy Assessment/Plan (PT)    Patient/Family Therapy Goals Statement (PT)  Patient is eager to go home  -     Criteria for Skilled Interventions Met (PT)  no;no problems identified which require skilled intervention  -[facility] Name 09/06/21 1053          Vital Signs    Pre SpO2 (%)  89  -JR     O2 Delivery Pre Treatment  supplemental O2 airvo  -JR     Intra SpO2 (%)  87  -JR     O2 Delivery Intra Treatment  supplemental O2 airvo  -JR     Post SpO2 (%)  89  -JR     O2 Delivery Post Treatment  supplemental O2 airvo  -JR     Pre Patient Position  Supine  -JR     Intra Patient Position  Standing  -JR     Post Patient Position  Sitting  -JR     Row Name 09/06/21 1053          Positioning and Restraints    Pre-Treatment Position  in bed  -JR     Post Treatment Position  bed  -JR     In Bed  sitting EOB;call light within reach;encouraged to call for assist;notified ns  -       User Key  (r) = Recorded By, (t) = Taken By, (c) = Cosigned By    Initials Name Provider Type    Alejandrina Sams, PT Physical Therapist        Outcome Measures     Row Name 09/06/21 1053          How much help from another person do you currently need...    Turning from your back to your side while in flat bed without using bedrails?  4  -JR     Moving from lying on back to sitting on the side of a flat bed without bedrails?  4  -JR     Moving to and from a bed to a chair (including a wheelchair)?  4  -JR     Standing up from a chair using your arms (e.g., wheelchair, bedside chair)?  4  -JR     Climbing 3-5 steps with a railing?  4  -JR     To walk  in hospital room?  4  -JR     AM-PAC 6 Clicks Score (PT)  24  -JR     Row Name 09/06/21 1053          Functional Assessment    Outcome Measure Options  AM-PAC 6 Clicks Basic Mobility (PT)  -       User Key  (r) = Recorded By, (t) = Taken By, (c) = Cosigned By    Initials Name Provider Type    Alejandrina Sams, PT Physical Therapist                       Physical Therapy Education                 Title: PT OT SLP Therapies (Resolved)     Topic: Physical Therapy (Resolved)     Point: Mobility training (Resolved)     Learning Progress Summary           Patient Acceptance, E,TB, VU by  at 9/6/2021 1838    Comment: Importance of mobility to recovery & BLE exercises                   Point: Home exercise program (Resolved)     Learning Progress Summary           Patient Acceptance, E,TB, VU by  at 9/6/2021 1838    Comment: Importance of mobility to recovery & BLE exercises                               User Key     Initials Effective Dates Name Provider Type [facility] 06/16/21 -  Alejandrina Spivey, PT Physical Therapist PT              PT Recommendation and Plan     Plan of Care Reviewed With: patient  Progress: no change  Outcome Summary: Patient participated well in PT evaluation and is able to maintain oxygen saturation at or above 87% with airvo.  Patient is able to demonstrate safe, independent functional mobility and does not require the skills of PT at this time.  He is able to demonstrate a good understanding of his oxygen saturation readings and mobility with sats above 86%.  He is instucted on the importance of mobility to recovery and demonstrates BLE exercises.  He understands that he can contact PT if his status changes.     Time Calculation:   PT Charges     Row Name 09/06/21 1839             Time Calculation    Start Time  1053  -JR      PT Received On  09/06/21  -         Untimed Charges    PT Eval/Re-eval Minutes  55  -JR         Total Minutes    Untimed Charges Total Minutes  55  -JR       Total  Minutes  55  -JR        User Key  (r) = Recorded By, (t) = Taken By, (c) = Cosigned By    Initials Name Provider Type    Alejandrina Sams, PT Physical Therapist        Therapy Charges for Today     Code Description Service Date Service Provider Modifiers Qty    76356177570 HC PT EVAL LOW COMPLEXITY 4 9/6/2021 Alejandrina Spivey, PT GP 1          PT G-Codes  Outcome Measure Options: AM-PAC 6 Clicks Basic Mobility (PT)  AM-PAC 6 Clicks Score (PT): 24    Alejandrina Spivey, PT  9/6/2021

## 2021-09-06 NOTE — PLAN OF CARE
Goal Outcome Evaluation:           Progress: no change  Outcome Summary: Patient has not had an increase in oxygent demand. Remains on 60L/80%. All vitals are stable and will continue to monitor. no overnight events to report.

## 2021-09-07 ENCOUNTER — APPOINTMENT (OUTPATIENT)
Dept: CARDIOLOGY | Facility: HOSPITAL | Age: 51
End: 2021-09-07

## 2021-09-07 LAB
QT INTERVAL: 482 MS
QT INTERVAL: 670 MS
QTC INTERVAL: 477 MS
QTC INTERVAL: 546 MS
TSH SERPL DL<=0.05 MIU/L-ACNC: 2.96 UIU/ML (ref 0.27–4.2)

## 2021-09-07 PROCEDURE — 94799 UNLISTED PULMONARY SVC/PX: CPT

## 2021-09-07 PROCEDURE — 99254 IP/OBS CNSLTJ NEW/EST MOD 60: CPT | Performed by: INTERNAL MEDICINE

## 2021-09-07 PROCEDURE — 63710000001 DEXAMETHASONE PER 0.25 MG: Performed by: EMERGENCY MEDICINE

## 2021-09-07 PROCEDURE — 25010000002 FUROSEMIDE PER 20 MG: Performed by: EMERGENCY MEDICINE

## 2021-09-07 PROCEDURE — 99232 SBSQ HOSP IP/OBS MODERATE 35: CPT | Performed by: EMERGENCY MEDICINE

## 2021-09-07 PROCEDURE — 25010000002 ENOXAPARIN PER 10 MG: Performed by: EMERGENCY MEDICINE

## 2021-09-07 RX ADMIN — DEXAMETHASONE 6 MG: 4 TABLET ORAL at 08:40

## 2021-09-07 RX ADMIN — Medication 30 ML: at 20:00

## 2021-09-07 RX ADMIN — FAMOTIDINE 20 MG: 20 TABLET, FILM COATED ORAL at 08:40

## 2021-09-07 RX ADMIN — SERTRALINE HYDROCHLORIDE 25 MG: 50 TABLET ORAL at 20:00

## 2021-09-07 RX ADMIN — SODIUM CHLORIDE, PRESERVATIVE FREE 10 ML: 5 INJECTION INTRAVENOUS at 20:00

## 2021-09-07 RX ADMIN — FUROSEMIDE 20 MG: 10 INJECTION, SOLUTION INTRAMUSCULAR; INTRAVENOUS at 08:41

## 2021-09-07 RX ADMIN — Medication 10 MG: at 20:00

## 2021-09-07 RX ADMIN — ENOXAPARIN SODIUM 40 MG: 40 INJECTION SUBCUTANEOUS at 20:00

## 2021-09-07 RX ADMIN — SODIUM CHLORIDE, PRESERVATIVE FREE 10 ML: 5 INJECTION INTRAVENOUS at 08:41

## 2021-09-07 RX ADMIN — DOCUSATE SODIUM 50MG AND SENNOSIDES 8.6MG 2 TABLET: 8.6; 5 TABLET, FILM COATED ORAL at 20:00

## 2021-09-07 NOTE — PLAN OF CARE
"Goal Outcome Evaluation:  Plan of Care Reviewed With: patient        Progress: improving  Outcome Summary: pleasant patient with no complaint of pain at this time-medications given per Dr. Lyles's orders-patient states \"I am feeling much better-slept better last night\"-monitor labs and continue to monitor patient-patient on Airvo-60L/100%  "

## 2021-09-07 NOTE — PROGRESS NOTES
"Adult Nutrition  Assessment/PES    Patient Name:  Alberto Torres  YOB: 1970  MRN: [id number]  Admit Date:  8/31/2021    Assessment Date:  9/7/2021    Comments:    Recommend:  1. Continue current diet order as medically appropriate and tolerated.  2. Encourage PO intake. Average intake ~55% x 9 meals.   3. RD ordered Boost Glucose Control daily. Continue Prostat BID.  4. Consider a multivitamin with minerals daily.  5. Continue to monitor and replace electrolytes PRN.      RD to follow pt and available PRN.      Reason for Assessment     Row Name 09/07/21 1608          Reason for Assessment    Reason For Assessment  identified at risk by screening criteria     Diagnosis  pulmonary disease COVID     Identified At Risk by Screening Criteria  other (see comments) 7 day admit           Anthropometrics     Row Name 09/07/21 1609          Anthropometrics    Height  185.4 cm (73\")        Ideal Body Weight (IBW)    Ideal Body Weight (IBW) (kg)  84.86         Labs/Tests/Procedures/Meds     Row Name 09/07/21 1609          Labs/Procedures/Meds    Lab Results Reviewed  reviewed, pertinent     Lab Results Comments  Low: Na, Cr; High: BUN, Gluc        Medications    Pertinent Medications Reviewed  reviewed, pertinent     Pertinent Medications Comments  Decadron, Pericolace, Prostat, Lasix, Pepcid           Estimated/Assessed Needs     Row Name 09/07/21 1609          Calculation Measurements    Weight Used For Calculations  76.6 kg (168 lb 14 oz)     Height  185.4 cm (73\")        Estimated/Assessed Needs    Additional Documentation  Fluid Requirements (Group);Protein Requirements (Group);Calorie Requirements (Group);Plano-St. Jeor Equation (Group)        Calorie Requirements    Estimated Calorie Requirement Comment  [id number]        Plano-St. Jeor Equation    RMR (Plano-St. Jeor Equation)  6439.877     Plano-St. Jeor Activity Factors  -- 1.3 AF        Protein Requirements    Weight Used For Protein " Calculations  76.6 kg (168 lb 14 oz)     Est Protein Requirement Amount (gms/kg)  1.2 gm protein 76-91 grams     Estimated Protein Requirements (gms/day)  91.92        Fluid Requirements    Fluid Requirements (mL/day)  -- 8914-6822 mL     Estimated Fluid Requirement Method  other (see comments) 1 mL/kcal         Nutrition Prescription Ordered     Row Name 09/07/21 1610          Nutrition Prescription PO    Current PO Diet  Regular     Supplement  ProStat     Supplement Frequency  2 times a day     Common Modifiers  Cardiac;Consistent Carbohydrate         Evaluation of Received Nutrient/Fluid Intake     Row Name 09/07/21 1610          PO Evaluation    Number of Days PO Intake Evaluated  3 days     Number of Meals  9     % PO Intake  55               Problem/Interventions:  Problem 1     Row Name 09/07/21 1611          Nutrition Diagnoses Problem 1    Problem 1  Increased Nutrient Needs     Macronutrient  Protein;Kcal     Etiology (related to)  Medical Diagnosis     Pulmonary/Critical Care  Other (comment) COVID     Signs/Symptoms (evidenced by)  Report/Observation     Reported/Observed By  MD               Intervention Goal     Row Name 09/07/21 1612          Intervention Goal    General  Meet nutritional needs for age/condition;Improved nutrition related lab(s)     PO  Meet estimated needs;PO intake (%);Increase intake     PO Intake %  75 %     Weight  Maintain weight         Nutrition Intervention     Row Name 09/07/21 1612          Nutrition Intervention    RD/Tech Action  Follow Tx progress;Encourage intake;Recommend/ordered     Recommended/Ordered  Supplement         Nutrition Prescription     Row Name 09/07/21 1612          Nutrition Prescription PO    PO Prescription  Begin/change supplement     Supplement  Boost Glucose Control     Supplement Frequency  Daily     New PO Prescription Ordered?  Yes        Other Orders    Obtain Weight  Daily     Obtain Weight Ordered?  No, recommended     Supplement  Vitamin  mineral supplement     Supplement Ordered?  No, recommended     Other  Continue to monitor and replace electrolytes PRN         Education/Evaluation     Row Name 09/07/21 0942          Education    Education  Education not appropriate at this time     Please explain  Other (comment) isolation status        Monitor/Evaluation    Monitor  Per protocol;I&O;PO intake;Supplement intake;Weight;Skin status;Pertinent labs           Electronically signed by:  Nury Gutierrez RD  09/07/21 16:13 EDT

## 2021-09-07 NOTE — PLAN OF CARE
Goal Outcome Evaluation:  Plan of Care Reviewed With: patient         Alberto is A+O x 4. He has been titrated down from 50L Airvo at 100% to 40L Airvo at 70%. He states that he is feeling better. He is up independently in his room. He is compliant with plan of care. Diuresis continues.

## 2021-09-07 NOTE — CONSULTS
"G-Cardiology Consult Note    Referring Provider: Jake  Reason for Consultation: Bradycardia    Patient Care Team:  Yajaira Saleh APRN as PCP - General    Chief complaint : Nausea and vomiting    Subjective:    History of present illness: This is a 51-year-old male patient who was diagnosed with Covid pneumonia on 8/23/2020.  The patient had progressively worsening shortness of breath and cough.  The patient developed nausea and vomiting and reported to the emergency room.  He was noted to be severely hypoxemic but did not require intubation.  While on cardiac monitoring, the patient has been noted to have bradycardia during sleep.  Heart rates have been in the upper 40s.  He is asymptomatic and unaware that his heart rate was slow.  The patient indicates that he has \"always had a low heart rate\".  He is not on any offending medications.  During waking hours the patient's heart rates have ranged from the mid 50s-mid 60s.  With minimal exertion in bed, his heart rate increases to the 70s.  The patient has no history of arrhythmia or syncope.  He has no history of myocardial infarction, coronary revascularization, congestive heart failure or valvular heart disease.  He has no history of hypertension diabetes or dyslipidemia.  He is a non-smoker.  Patient feels that his shortness of breath has gradually improved although he continues to require high flow oxygen.  His only complaint currently is feeling \"tired\".  The patient's BNP and cardiac troponins were normal.  D-dimer was elevated.  CT scan of the chest showed evidence of bilateral viral pneumonia but no evidence of pulmonary embolus.  Twelve-lead electrocardiogram shows sinus bradycardia with normal axis.  There is nonspecific T wave changes with QT prolongation.    Review of Systems   Review of Systems   Constitutional: Negative for chills, diaphoresis, fever, malaise/fatigue, weight gain and weight loss.   HENT: Negative for ear discharge, hearing loss, " hoarse voice and nosebleeds.    Eyes: Negative for discharge, double vision, pain and photophobia.   Cardiovascular: Negative for chest pain, claudication, cyanosis, dyspnea on exertion, irregular heartbeat, leg swelling, near-syncope, orthopnea, palpitations, paroxysmal nocturnal dyspnea and syncope.   Respiratory: Negative for cough, hemoptysis, shortness of breath, sputum production and wheezing.    Endocrine: Negative for cold intolerance, heat intolerance, polydipsia, polyphagia and polyuria.   Hematologic/Lymphatic: Negative for adenopathy and bleeding problem. Does not bruise/bleed easily.   Skin: Negative for color change, flushing, itching and rash.   Musculoskeletal: Negative for muscle cramps, muscle weakness, myalgias and stiffness.   Gastrointestinal: Negative for abdominal pain, diarrhea, hematemesis, hematochezia, nausea and vomiting.   Genitourinary: Negative for dysuria, frequency and nocturia.   Neurological: Negative for focal weakness, loss of balance, numbness, paresthesias and seizures.   Psychiatric/Behavioral: Negative for altered mental status, hallucinations and suicidal ideas.   Allergic/Immunologic: Negative for HIV exposure, hives and persistent infections.       History  Past Medical History:   Diagnosis Date   • Impaired functional mobility, balance, gait, and endurance    , History reviewed. No pertinent surgical history., History reviewed. No pertinent family history.,   Social History     Tobacco Use   • Smoking status: Never Smoker   Substance Use Topics   • Alcohol use: Yes     Comment: OCCASIONALLY   • Drug use: Not on file   ,   Medications Prior to Admission   Medication Sig Dispense Refill Last Dose   • dexamethasone (DECADRON) 0.5 MG tablet Take 2 mg by mouth 2 (Two) Times a Day With Meals. For 5 days (beginning 8-30-21)   8/31/2021 at Unknown time   • Omega-3 Fatty Acids (FISH OIL OMEGA-3 PO) Take  by mouth.   Past Month at Unknown time   • promethazine (PHENERGAN) 6.25  "MG/5ML syrup Take 6.25 mg by mouth 3 (Three) Times a Day. For 3 days (beginning 8-30-21)   8/31/2021 at Unknown time   • azithromycin (ZITHROMAX) 250 MG tablet Take 2 tablets the first day, then 1 tablet daily for the remaining 4 days. (Patient not taking: Reported on 9/1/2021) 6 tablet 0 Not Taking at Unknown time   • ibuprofen (ADVIL,MOTRIN) 200 MG tablet Take 200 mg by mouth every 6 (six) hours as needed for mild pain (1-3).   Unknown at Unknown time   • mupirocin (BACTROBAN) 2 % ointment Apply 1 application topically to the appropriate area as directed As Needed. (Patient not taking: Reported on 9/1/2021)   Unknown at Unknown time    and Allergies:  Patient has no known allergies.    Objective:    Vital Sign Min/Max for last 24 hours  Temp  Min: 97.5 °F (36.4 °C)  Max: 97.9 °F (36.6 °C)   BP  Min: 108/67  Max: 131/82   Pulse  Min: 46  Max: 56   Resp  Min: 20  Max: 22   SpO2  Min: 92 %  Max: 94 %   Flow (L/min)  Min: 50  Max: 60   No data recorded     Flowsheet Rows      First Filed Value   Admission Height  185.4 cm (73\") Documented at 08/31/2021 1255   Admission Weight  85.3 kg (188 lb) Documented at 08/31/2021 1255             Physical Exam:   Vitals and nursing note reviewed.   Constitutional:       Appearance: Healthy appearance. Not in distress.   Neck:      Vascular: No JVR. JVD normal.   Pulmonary:      Effort: Pulmonary effort is normal.      Breath sounds: Normal breath sounds. No wheezing. No rhonchi. No rales.   Chest:      Chest wall: Not tender to palpatation.   Cardiovascular:      PMI at left midclavicular line. Normal rate. Regular rhythm. Normal S1. Normal S2.      Murmurs: There is no murmur.      No gallop. No click. No rub.   Pulses:     Intact distal pulses.   Edema:     Peripheral edema absent.   Abdominal:      General: Bowel sounds are normal.      Palpations: Abdomen is soft.      Tenderness: There is no abdominal tenderness.   Musculoskeletal: Normal range of motion.         General: No " tenderness. Skin:     General: Skin is warm and dry.   Neurological:      General: No focal deficit present.      Mental Status: Alert and oriented to person, place and time.         Results Review:   I reviewed the patient's new clinical results.  Results from last 7 days   Lab Units 09/06/21  0606 09/05/21 0637 09/04/21 0614   WBC 10*3/mm3 6.74 7.07 8.31   HEMOGLOBIN g/dL 15.4 15.7 14.9   HEMATOCRIT % 43.5 43.7 42.3   PLATELETS 10*3/mm3 415 432 389     Results from last 7 days   Lab Units 09/06/21  0606 09/05/21  0637 09/05/21  0637 09/04/21  0614 09/04/21 0614   SODIUM mmol/L 134*  --  134*  --  136   POTASSIUM mmol/L 4.7  --  4.5  --  4.3   CHLORIDE mmol/L 100  --  100  --  101   CO2 mmol/L 22.8  --  23.1  --  24.5   BUN mg/dL 21*  --  18  --  18   CREATININE mg/dL 0.74*  --  0.75*  --  0.70*   GLUCOSE mg/dL 131*   < > 119*   < > 129*   CALCIUM mg/dL 8.9  --  9.0  --  8.9    < > = values in this interval not displayed.     Lab Results   Lab Value Date/Time    TROPONINT <0.010 08/31/2021 1310             Assessment/Plan:      COVID-19      Asymptomatic nocturnal mild bradycardia.  Abnormal ECG with nonspecific T wave changes and QT prolongation.  The patient is on no offending medications.  No history of arrhythmia or syncope.    No further inpatient testing is necessary.  Echocardiogram can be performed as an outpatient.    I discussed the patients findings and my recommendations with patient    Pavan Mcfarlane MD  09/07/21  11:12 EDT

## 2021-09-07 NOTE — PROGRESS NOTES
[facility]    Name:  Alberto Torres   Age:  51 y.o.  Sex:  male  :  1970  MRN:  [id number]   Visit Number:  [id number]  Admission Date:  2021  Date Of Service:  21  Primary Care Physician:  Yajaira Saleh APRN     LOS: 7 days :    Chief Complaint:   Shortness of breath, COVID-19 Pneumonia    Subjective: He is feeling better. Denies weakness or other issues. FiO2 is down to 70% 60 L with Airvo.    Hospital Course: 51 unvaccinated male who presented with c/o worsening shortness of breath. Diagnosed with COVID on 21. In the ED, he had a SPO2 of 80% on 10L. He was placed on 30L high flow nasal cannula, 100% FiO2 which improved oxygen saturation to 98%. He was then admitted to the hospital and started on Dexamethasone.  Not a candidate for remdesivir given increased FiO2 requirement.    Review of Systems: All systems were reviewed and negative except as mentioned in subjective, assessment and plan.    Vital Signs:  Temp:  [97.5 °F (36.4 °C)-97.9 °F (36.6 °C)] 97.8 °F (36.6 °C)  Heart Rate:  [46-60] 60  Resp:  [19-22] 19  BP: (104-131)/(56-84) 104/56    Intake and output:  I/O last 3 completed shifts:  In: 1560 [P.O.:1560]  Out: 2400 [Urine:2400]  I/O this shift:  In: 720 [P.O.:720]  Out: 1180 [Urine:1180]    Physical Examination:  General: NAD, resting in bed  HEENT: EOMI, NC/AT  Heart: Regular  Lungs: Labored breathing with accessory muscle usage  Abdomen: Soft, nondistended, nontender  Extremities: No edema  Neurological: A&O x3, moves all extremities  Psychological: Mood and affect appropriate, speech is coherent  Skin: warm, dry    Laboratory results:  Results from last 7 days   Lab Units 21  0606 21  0637 21  0614   SODIUM mmol/L 134* 134* 136   POTASSIUM mmol/L 4.7 4.5 4.3   CHLORIDE mmol/L 100 100 101   CO2 mmol/L 22.8 23.1 24.5   BUN mg/dL 21* 18 18   CREATININE mg/dL 0.74* 0.75* 0.70*   CALCIUM mg/dL 8.9 9.0 8.9    GLUCOSE mg/dL 131* 119* 129*     Results from last 7 days   Lab Units 09/06/21  0606 09/05/21  0637 09/04/21  0614   WBC 10*3/mm3 6.74 7.07 8.31   HEMOGLOBIN g/dL 15.4 15.7 14.9   HEMATOCRIT % 43.5 43.7 42.3   PLATELETS 10*3/mm3 415 432 389     I have reviewed the patient's laboratory results.    Radiology results:No radiology results from the last 24 hrs  CT PE 8/31/2021: negative for PE     Medication Review: I have reviewed the patient's active and prn medications.     Problem List:  COVID-19    Assessment:  1. Acute Hypoxic Respiratory Failure   2. Severe COVID-19 Pneumonia, POA    Plan:  -Support on CPAP while resting and sleeping (RECOVERY-RS)  -Continue steroids, low-dose daily diuretic  -Case personally discussed with Dr. Mcfarlane regarding the bradycardia, appearing to be inconsequential as he is asymptomatic    Nathan Joseph DO  09/07/21  15:24 EDT    Dictated utilizing Dragon dictation.

## 2021-09-08 PROCEDURE — 99232 SBSQ HOSP IP/OBS MODERATE 35: CPT | Performed by: EMERGENCY MEDICINE

## 2021-09-08 PROCEDURE — 25010000002 FUROSEMIDE PER 20 MG: Performed by: EMERGENCY MEDICINE

## 2021-09-08 PROCEDURE — 63710000001 DEXAMETHASONE PER 0.25 MG: Performed by: EMERGENCY MEDICINE

## 2021-09-08 PROCEDURE — 25010000002 ENOXAPARIN PER 10 MG: Performed by: EMERGENCY MEDICINE

## 2021-09-08 RX ADMIN — Medication 30 ML: at 21:37

## 2021-09-08 RX ADMIN — ENOXAPARIN SODIUM 40 MG: 40 INJECTION SUBCUTANEOUS at 21:31

## 2021-09-08 RX ADMIN — FUROSEMIDE 20 MG: 10 INJECTION, SOLUTION INTRAMUSCULAR; INTRAVENOUS at 09:14

## 2021-09-08 RX ADMIN — GUAIFENESIN AND DEXTROMETHORPHAN 15 ML: 100; 10 SYRUP ORAL at 09:14

## 2021-09-08 RX ADMIN — Medication 10 MG: at 21:31

## 2021-09-08 RX ADMIN — DEXAMETHASONE 6 MG: 4 TABLET ORAL at 09:14

## 2021-09-08 RX ADMIN — SODIUM CHLORIDE, PRESERVATIVE FREE 10 ML: 5 INJECTION INTRAVENOUS at 21:34

## 2021-09-08 RX ADMIN — FAMOTIDINE 20 MG: 20 TABLET, FILM COATED ORAL at 09:14

## 2021-09-08 RX ADMIN — SERTRALINE HYDROCHLORIDE 25 MG: 50 TABLET ORAL at 21:30

## 2021-09-08 RX ADMIN — Medication 30 ML: at 10:00

## 2021-09-08 RX ADMIN — DOCUSATE SODIUM 50MG AND SENNOSIDES 8.6MG 2 TABLET: 8.6; 5 TABLET, FILM COATED ORAL at 09:14

## 2021-09-08 RX ADMIN — SODIUM CHLORIDE, PRESERVATIVE FREE 10 ML: 5 INJECTION INTRAVENOUS at 09:15

## 2021-09-08 NOTE — PROGRESS NOTES
[facility]    Name:  Alberto Torres   Age:  51 y.o.  Sex:  male  :  1970  MRN:  [id number]   Visit Number:  [id number]  Admission Date:  2021  Date Of Service:  21  Primary Care Physician:  Yajaira Saleh APRN     LOS: 8 days :    Chief Complaint:   Shortness of breath, COVID-19 Pneumonia    Subjective: He feels better. FiO2 is down to 67% 25 L with Airvo.    Hospital Course: 51 unvaccinated male who presented with c/o worsening shortness of breath. Diagnosed with COVID on 21. In the ED, he had a SPO2 of 80% on 10L. He was placed on 30L high flow nasal cannula, 100% FiO2 which improved oxygen saturation to 98%. He was then admitted to the hospital and started on Dexamethasone.  Not a candidate for remdesivir given increased FiO2 requirement.    Review of Systems: All systems were reviewed and negative except as mentioned in subjective, assessment and plan.    Vital Signs:  Temp:  [96.6 °F (35.9 °C)-100.1 °F (37.8 °C)] 100.1 °F (37.8 °C)  Heart Rate:  [51-85] 65  Resp:  [18-20] 20  BP: ()/(52-86) 107/73    Intake and output:  I/O last 3 completed shifts:  In: 1800 [P.O.:1800]  Out: 2805 [Urine:2805]  I/O this shift:  In: -   Out: 525 [Urine:525]    Physical Examination:  General: NAD, resting in bed  HEENT: EOMI, NC/AT  Heart: Regular  Lungs: mildly labored  Abdomen: Soft, nondistended, nontender  Extremities: No edema  Neurological: A&O x3, moves all extremities  Psychological: Mood and affect appropriate, speech is coherent  Skin: warm, dry    Laboratory results:  Results from last 7 days   Lab Units 21  0606 21  0637 21  0614   SODIUM mmol/L 134* 134* 136   POTASSIUM mmol/L 4.7 4.5 4.3   CHLORIDE mmol/L 100 100 101   CO2 mmol/L 22.8 23.1 24.5   BUN mg/dL 21* 18 18   CREATININE mg/dL 0.74* 0.75* 0.70*   CALCIUM mg/dL 8.9 9.0 8.9   GLUCOSE mg/dL 131* 119* 129*     Results from last 7 days   Lab Units 21  0606  09/05/21  0637 09/04/21  0614   WBC 10*3/mm3 6.74 7.07 8.31   HEMOGLOBIN g/dL 15.4 15.7 14.9   HEMATOCRIT % 43.5 43.7 42.3   PLATELETS 10*3/mm3 415 432 389     I have reviewed the patient's laboratory results.    Radiology results:No radiology results from the last 24 hrs  CT PE 8/31/2021: negative for PE     Medication Review: I have reviewed the patient's active and prn medications.     Problem List:  COVID-19    Assessment:  1. Acute Hypoxic Respiratory Failure   2. Severe COVID-19 Pneumonia, POA    Plan:  -Support on CPAP while resting and sleeping  -Continue steroids, low-dose daily diuretic    Nathan Joseph DO  09/08/21  14:13 EDT    Dictated utilizing Dragon dictation.

## 2021-09-08 NOTE — CASE MANAGEMENT/SOCIAL WORK
Continued Stay Note   [facility]ay     Patient Name: Alberto Torres  MRN: [id number]  Today's Date: 9/8/2021    Admit Date: 8/31/2021    Discharge Plan    No changes to discharge planning. Plan is to go home with [employer] for oxygen if needed.      Bonnie Barlow RN

## 2021-09-08 NOTE — PLAN OF CARE
Goal Outcome Evaluation:  Plan of Care Reviewed With: patient        Progress: improving  Outcome Summary: pleaasnt patient with no complaint of pain at this time-medications given per Dr. Lyles's orders-monitor labs and continue to monitor patient -O2 adjusted with patient able to tolerate lower setting-continue to monitor

## 2021-09-09 LAB
ALBUMIN SERPL-MCNC: 2.9 G/DL (ref 3.5–5.2)
ANION GAP SERPL CALCULATED.3IONS-SCNC: 8.5 MMOL/L (ref 5–15)
BASOPHILS # BLD AUTO: 0.01 10*3/MM3 (ref 0–0.2)
BASOPHILS NFR BLD AUTO: 0.1 % (ref 0–1.5)
BUN SERPL-MCNC: 19 MG/DL (ref 6–20)
BUN/CREAT SERPL: 26 (ref 7–25)
CALCIUM SPEC-SCNC: 9.1 MG/DL (ref 8.6–10.5)
CHLORIDE SERPL-SCNC: 100 MMOL/L (ref 98–107)
CO2 SERPL-SCNC: 27.5 MMOL/L (ref 22–29)
CREAT SERPL-MCNC: 0.73 MG/DL (ref 0.76–1.27)
DEPRECATED RDW RBC AUTO: 38.7 FL (ref 37–54)
EOSINOPHIL # BLD AUTO: 0.29 10*3/MM3 (ref 0–0.4)
EOSINOPHIL NFR BLD AUTO: 2.8 % (ref 0.3–6.2)
ERYTHROCYTE [DISTWIDTH] IN BLOOD BY AUTOMATED COUNT: 11.7 % (ref 12.3–15.4)
GFR SERPL CREATININE-BSD FRML MDRD: 114 ML/MIN/1.73
GLUCOSE SERPL-MCNC: 99 MG/DL (ref 65–99)
HCT VFR BLD AUTO: 42.4 % (ref 37.5–51)
HGB BLD-MCNC: 14.8 G/DL (ref 13–17.7)
IMM GRANULOCYTES # BLD AUTO: 0.1 10*3/MM3 (ref 0–0.05)
IMM GRANULOCYTES NFR BLD AUTO: 1 % (ref 0–0.5)
LYMPHOCYTES # BLD AUTO: 1.17 10*3/MM3 (ref 0.7–3.1)
LYMPHOCYTES NFR BLD AUTO: 11.5 % (ref 19.6–45.3)
MCH RBC QN AUTO: 31.8 PG (ref 26.6–33)
MCHC RBC AUTO-ENTMCNC: 34.9 G/DL (ref 31.5–35.7)
MCV RBC AUTO: 91 FL (ref 79–97)
MONOCYTES # BLD AUTO: 0.95 10*3/MM3 (ref 0.1–0.9)
MONOCYTES NFR BLD AUTO: 9.3 % (ref 5–12)
NEUTROPHILS NFR BLD AUTO: 7.67 10*3/MM3 (ref 1.7–7)
NEUTROPHILS NFR BLD AUTO: 75.3 % (ref 42.7–76)
NRBC BLD AUTO-RTO: 0 /100 WBC (ref 0–0.2)
PHOSPHATE SERPL-MCNC: 3.1 MG/DL (ref 2.5–4.5)
PLATELET # BLD AUTO: 384 10*3/MM3 (ref 140–450)
PMV BLD AUTO: 11.1 FL (ref 6–12)
POTASSIUM SERPL-SCNC: 3.7 MMOL/L (ref 3.5–5.2)
PROCALCITONIN SERPL-MCNC: 0.11 NG/ML (ref 0–0.25)
RBC # BLD AUTO: 4.66 10*6/MM3 (ref 4.14–5.8)
SODIUM SERPL-SCNC: 136 MMOL/L (ref 136–145)
WBC # BLD AUTO: 10.19 10*3/MM3 (ref 3.4–10.8)

## 2021-09-09 PROCEDURE — 25010000002 ENOXAPARIN PER 10 MG: Performed by: EMERGENCY MEDICINE

## 2021-09-09 PROCEDURE — 85025 COMPLETE CBC W/AUTO DIFF WBC: CPT | Performed by: EMERGENCY MEDICINE

## 2021-09-09 PROCEDURE — 99232 SBSQ HOSP IP/OBS MODERATE 35: CPT | Performed by: EMERGENCY MEDICINE

## 2021-09-09 PROCEDURE — 63710000001 DEXAMETHASONE PER 0.25 MG: Performed by: EMERGENCY MEDICINE

## 2021-09-09 PROCEDURE — 84145 PROCALCITONIN (PCT): CPT | Performed by: EMERGENCY MEDICINE

## 2021-09-09 PROCEDURE — 25010000002 FUROSEMIDE PER 20 MG: Performed by: EMERGENCY MEDICINE

## 2021-09-09 PROCEDURE — 80069 RENAL FUNCTION PANEL: CPT | Performed by: EMERGENCY MEDICINE

## 2021-09-09 RX ADMIN — Medication 30 ML: at 21:58

## 2021-09-09 RX ADMIN — FUROSEMIDE 20 MG: 10 INJECTION, SOLUTION INTRAMUSCULAR; INTRAVENOUS at 08:48

## 2021-09-09 RX ADMIN — SODIUM CHLORIDE, PRESERVATIVE FREE 10 ML: 5 INJECTION INTRAVENOUS at 21:58

## 2021-09-09 RX ADMIN — DEXAMETHASONE 6 MG: 4 TABLET ORAL at 08:46

## 2021-09-09 RX ADMIN — SERTRALINE HYDROCHLORIDE 25 MG: 50 TABLET ORAL at 21:56

## 2021-09-09 RX ADMIN — Medication 30 ML: at 08:50

## 2021-09-09 RX ADMIN — SODIUM CHLORIDE, PRESERVATIVE FREE 10 ML: 5 INJECTION INTRAVENOUS at 08:47

## 2021-09-09 RX ADMIN — Medication 10 MG: at 21:57

## 2021-09-09 RX ADMIN — ENOXAPARIN SODIUM 40 MG: 40 INJECTION SUBCUTANEOUS at 21:57

## 2021-09-09 RX ADMIN — FAMOTIDINE 20 MG: 20 TABLET, FILM COATED ORAL at 08:46

## 2021-09-09 NOTE — PAYOR COMM NOTE
"Negro De La Fuente (51 y.o. Male)     Date of Birth Social Security Number Address Home Phone MRN    1970  [address] [address] [phone] [id number]    Church Marital Status          None        Admission Date Admission Type Admitting Provider Attending Provider Department, Room/Bed    8/31/21 Emergency Nathan Joseph DO Bhatti, Umar, DO [facility] MED SURG  3, 326/1    Discharge Date Discharge Disposition Discharge Destination                       Attending Provider: Nathan Joseph DO    Allergies: No Known Allergies    Isolation: Enh Drop/Con, Contact Air   Infection: COVID (confirmed) (09/01/21)   Code Status: CPR    Ht: 185.4 cm (73\")   Wt: 77.1 kg (169 lb 15.6 oz)    Admission Cmt: None   Principal Problem: None                Active Insurance as of 8/31/2021     Primary Coverage     Payor Plan Insurance Group Employer/Plan Group    [id number] PPO [id number]     Payor Plan Address Payor Plan Phone Number Payor Plan Fax Number Effective Dates    [address] [phone]  1/1/2008 - None Entered    [place] [address]       Subscriber Name Subscriber Birth Date Member ID       NEGRO DE LA FUENTE 1970 [id number]                 Emergency Contacts      (Rel.) Home Phone Work Phone Mobile Phone    AMERICA SANDOVAL (Sister) [phone] -- --        Continued stay    Oxygen Therapy (last day)     Date/Time   SpO2   Device (Oxygen Therapy)   Flow (L/min)   Oxygen Concentration (%)   ETCO2 (mmHg)    09/09/21 1529   96   --   --   --   --    09/09/21 1200   --   heated;high-flow nasal cannula   25   60   --    09/09/21 1100   91   heated;high-flow nasal cannula   25   60   --    09/09/21 0800   --   --   25   60   --    09/09/21 0731   95   heated;high-flow nasal cannula   25   60   --    09/09/21 0343   95   --   --   --   --    09/08/21 2326   97   --   --   --   --    09/08/21 2021   92   --   --   --   --    " "09/08/21 2000   --   heated;high-flow nasal cannula   25   --   --    09/08/21 1600   --   heated;high-flow nasal cannula   25   63   --    09/08/21 1519   97   --   --   --   --    09/08/21 1200   --   heated;high-flow nasal cannula   25   63   --    09/08/21 1113   94   --   --   --   --    09/08/21 0830   --   heated;high-flow nasal cannula   25   63   --    09/08/21 0824   (!) 88   --   --   --   --    09/08/21 0600   --   heated;high-flow nasal cannula;humidified   25   63   --    09/08/21 0408   92   --   --   --   --    09/08/21 0400   --   heated;high-flow nasal cannula;humidified   25   63   --    09/08/21 0200   --   heated;high-flow nasal cannula;humidified   25   63   --    09/08/21 0000   --   heated;high-flow nasal cannula;humidified   25   63   --              Lab Results (last 24 hours)     Procedure Component Value Units Date/Time    Procalcitonin [[id number]]  (Normal) Collected: 09/09/21 0601    Specimen: Blood Updated: 09/09/21 0708     Procalcitonin 0.11 ng/mL     Narrative:      As a Marker for Sepsis (Non-Neonates):     1. <0.5 ng/mL represents a low risk of severe sepsis and/or septic shock.  2. >2 ng/mL represents a high risk of severe sepsis and/or septic shock.    As a Marker for Lower Respiratory Tract Infections that require antibiotic therapy:  PCT on Admission     Antibiotic Therapy             6-12 Hrs later  >0.5                          Strongly Recommended            >0.25 - <0.5             Recommended  0.1 - 0.25                  Discouraged                       Remeasure/reassess PCT  <0.1                         Strongly Discouraged         Remeasure/reassess PCT      As 28 day mortality risk marker: \"Change in Procalcitonin Result\" (>80% or <=80%) if Day 0 (or Day 1) and Day 4 values are available. Refer to http://www.kWhOURSs-pct-calculator.com/    Change in PCT <=80 %   A decrease of PCT levels below or equal to 80% defines a positive change in PCT test result representing a " higher risk for 28-day all-cause mortality of patients diagnosed with severe sepsis or septic shock.    Change in PCT >80 %   A decrease of PCT levels of more than 80% defines a negative change in PCT result representing a lower risk for 28-day all-cause mortality of patients diagnosed with severe sepsis or septic shock.              Results may be falsely decreased if patient taking Biotin.     Renal Function Panel [[id number]]  (Abnormal) Collected: 09/09/21 0601    Specimen: Blood Updated: 09/09/21 0703     Glucose 99 mg/dL      BUN 19 mg/dL      Creatinine 0.73 mg/dL      Sodium 136 mmol/L      Potassium 3.7 mmol/L      Chloride 100 mmol/L      CO2 27.5 mmol/L      Calcium 9.1 mg/dL      Albumin 2.90 g/dL      Phosphorus 3.1 mg/dL      Anion Gap 8.5 mmol/L      BUN/Creatinine Ratio 26.0     eGFR Non African Amer 114 mL/min/1.73     Narrative:      GFR Normal >60  Chronic Kidney Disease <60  Kidney Failure <15      CBC & Differential [[id number]]  (Abnormal) Collected: 09/09/21 0601    Specimen: Blood Updated: 09/09/21 0640    Narrative:      The following orders were created for panel order CBC & Differential.  Procedure                               Abnormality         Status                     ---------                               -----------         ------                     CBC Auto Differential[[id number]]        Abnormal            Final result                 Please view results for these tests on the individual orders.    CBC Auto Differential [[id number]]  (Abnormal) Collected: 09/09/21 0601    Specimen: Blood Updated: 09/09/21 0640     WBC 10.19 10*3/mm3      RBC 4.66 10*6/mm3      Hemoglobin 14.8 g/dL      Hematocrit 42.4 %      MCV 91.0 fL      MCH 31.8 pg      MCHC 34.9 g/dL      RDW 11.7 %      RDW-SD 38.7 fl      MPV 11.1 fL      Platelets 384 10*3/mm3      Neutrophil % 75.3 %      Lymphocyte % 11.5 %      Monocyte % 9.3 %      Eosinophil % 2.8 %      Basophil % 0.1 %      Immature Grans % 1.0 %       Neutrophils, Absolute 7.67 10*3/mm3      Lymphocytes, Absolute 1.17 10*3/mm3      Monocytes, Absolute 0.95 10*3/mm3      Eosinophils, Absolute 0.29 10*3/mm3      Basophils, Absolute 0.01 10*3/mm3      Immature Grans, Absolute 0.10 10*3/mm3      nRBC 0.0 /100 WBC         Imaging Results (Last 24 Hours)     ** No results found for the last 24 hours. **           Physician Progress Notes (last 48 hours) (Notes from 21 1610 through 21 1610)      Nathan Joseph, DO at 21 1459              [facility]    Name:  Alberto Torres   Age:  51 y.o.  Sex:  male  :  1970  MRN:  [id number]   Visit Number:  [id number]  Admission Date:  2021  Date Of Service:  21  Primary Care Physician:  Yajaira Saleh APRN     LOS: 9 days :    Chief Complaint:   Shortness of breath, COVID-19 Pneumonia    Subjective: He feels better and is curious when he can go home. FiO2 is down to 40% with Airvo.    Hospital Course: 51 unvaccinated male who presented with c/o worsening shortness of breath. Diagnosed with COVID on 21. In the ED, he had a SPO2 of 80% on 10L. He was placed on 30L high flow nasal cannula, 100% FiO2 which improved oxygen saturation to 98%. He was then admitted to the hospital and started on Dexamethasone.  Not a candidate for remdesivir given increased FiO2 requirement.    Review of Systems: All systems were reviewed and negative except as mentioned in subjective, assessment and plan.    Vital Signs:  Temp:  [97.7 °F (36.5 °C)-98.6 °F (37 °C)] 98.6 °F (37 °C)  Heart Rate:  [52-78] 78  Resp:  [20] 20  BP: ()/(60-88) 96/64    Intake and output:  I/O last 3 completed shifts:  In:  [P.O.:]  Out: 2525 [Urine:2525]  I/O this shift:  In: 600 [P.O.:600]  Out: 1250 [Urine:1250]    Physical Examination:  General: NAD, resting in bed  HEENT: EOMI, NC/AT  Heart: Regular  Lungs: nonlabored  Abdomen: Soft, nondistended, nontender  Extremities: No  edema  Neurological: A&O x3, moves all extremities  Psychological: Mood and affect appropriate, speech is coherent  Skin: warm, dry    Laboratory results:  Results from last 7 days   Lab Units 21  0621  0621  0637   SODIUM mmol/L 136 134* 134*   POTASSIUM mmol/L 3.7 4.7 4.5   CHLORIDE mmol/L 100 100 100   CO2 mmol/L 27.5 22.8 23.1   BUN mg/dL 19 21* 18   CREATININE mg/dL 0.73* 0.74* 0.75*   CALCIUM mg/dL 9.1 8.9 9.0   GLUCOSE mg/dL 99 131* 119*     Results from last 7 days   Lab Units 21  0621  0621  0637   WBC 10*3/mm3 10.19 6.74 7.07   HEMOGLOBIN g/dL 14.8 15.4 15.7   HEMATOCRIT % 42.4 43.5 43.7   PLATELETS 10*3/mm3 384 415 432     I have reviewed the patient's laboratory results.    Radiology results:No radiology results from the last 24 hrs  CT PE 2021: negative for PE     Medication Review: I have reviewed the patient's active and prn medications.     Problem List:  COVID-19    Assessment:  1. Acute Hypoxic Respiratory Failure   2. Severe COVID-19 Pneumonia, POA    Plan:  -Support on CPAP while resting and sleeping  -Completing steroids, low-dose diuretic    Nathan Joseph DO  21  14:59 EDT    Dictated utilizing Dragon dictation.    Electronically signed by Nathan Joseph DO at 21 1502     Nathan Joseph DO at 21 1413              [facility]    PROGRESS NOTE    Name:  Alberto Torres   Age:  51 y.o.  Sex:  male  :  1970  MRN:  [id number]   Visit Number:  [id number]  Admission Date:  2021  Date Of Service:  21  Primary Care Physician:  Yajaira Saleh APRN     LOS: 8 days :    Chief Complaint:   Shortness of breath, COVID-19 Pneumonia    Subjective: He feels better. FiO2 is down to 67% 25 L with Airvo.    Hospital Course: 51 unvaccinated male who presented with c/o worsening shortness of breath. Diagnosed with COVID on 21. In the ED, he had a SPO2 of 80% on 10L. He was placed on 30L high flow  nasal cannula, 100% FiO2 which improved oxygen saturation to 98%. He was then admitted to the hospital and started on Dexamethasone.  Not a candidate for remdesivir given increased FiO2 requirement.    Review of Systems: All systems were reviewed and negative except as mentioned in subjective, assessment and plan.    Vital Signs:  Temp:  [96.6 °F (35.9 °C)-100.1 °F (37.8 °C)] 100.1 °F (37.8 °C)  Heart Rate:  [51-85] 65  Resp:  [18-20] 20  BP: ()/(52-86) 107/73    Intake and output:  I/O last 3 completed shifts:  In: 1800 [P.O.:1800]  Out: 2805 [Urine:2805]  I/O this shift:  In: -   Out: 525 [Urine:525]    Physical Examination:  General: NAD, resting in bed  HEENT: EOMI, NC/AT  Heart: Regular  Lungs: mildly labored  Abdomen: Soft, nondistended, nontender  Extremities: No edema  Neurological: A&O x3, moves all extremities  Psychological: Mood and affect appropriate, speech is coherent  Skin: warm, dry    Laboratory results:  Results from last 7 days   Lab Units 09/06/21  0606 09/05/21  0637 09/04/21  0614   SODIUM mmol/L 134* 134* 136   POTASSIUM mmol/L 4.7 4.5 4.3   CHLORIDE mmol/L 100 100 101   CO2 mmol/L 22.8 23.1 24.5   BUN mg/dL 21* 18 18   CREATININE mg/dL 0.74* 0.75* 0.70*   CALCIUM mg/dL 8.9 9.0 8.9   GLUCOSE mg/dL 131* 119* 129*     Results from last 7 days   Lab Units 09/06/21  0606 09/05/21  0637 09/04/21  0614   WBC 10*3/mm3 6.74 7.07 8.31   HEMOGLOBIN g/dL 15.4 15.7 14.9   HEMATOCRIT % 43.5 43.7 42.3   PLATELETS 10*3/mm3 415 432 389     I have reviewed the patient's laboratory results.    Radiology results:No radiology results from the last 24 hrs  CT PE 8/31/2021: negative for PE     Medication Review: I have reviewed the patient's active and prn medications.     Problem List:  COVID-19    Assessment:  3. Acute Hypoxic Respiratory Failure   4. Severe COVID-19 Pneumonia, POA    Plan:  -Support on CPAP while resting and sleeping  -Continue steroids, low-dose daily diuretic    Nathan Joseph,    09/08/21  14:13 EDT    Dictated utilizing Dragon dictation.    Electronically signed by Nathan Joseph DO at 09/08/21 1414       Consult Notes (last 48 hours) (Notes from 09/07/21 1610 through 09/09/21 1610)    No notes of this type exist for this encounter.

## 2021-09-09 NOTE — PLAN OF CARE
Problem: Adult Inpatient Plan of Care  Goal: Plan of Care Review  Outcome: Ongoing, Progressing  Flowsheets (Taken 9/9/2021 0312)  Plan of Care Reviewed With: patient  Outcome Summary: 25 LITERS HIGH FLOW PER NC.RESTED WELL.   Goal Outcome Evaluation:  Plan of Care Reviewed With: patient           Outcome Summary: 25 LITERS HIGH FLOW PER NC.RESTED WELL.

## 2021-09-09 NOTE — PLAN OF CARE
Goal Outcome Evaluation:  Plan of Care Reviewed With: patient   Maintained SAT on 25 liters at 60%.  No reports of pain.  No acute events this shift.

## 2021-09-09 NOTE — PROGRESS NOTES
[facility]    Name:  Alberto Torres   Age:  51 y.o.  Sex:  male  :  1970  MRN:  [id number]   Visit Number:  [id number]  Admission Date:  2021  Date Of Service:  21  Primary Care Physician:  Yajaira Saleh APRN     LOS: 9 days :    Chief Complaint:   Shortness of breath, COVID-19 Pneumonia    Subjective: He feels better and is curious when he can go home. FiO2 is down to 40% with Airvo.    Hospital Course: 51 unvaccinated male who presented with c/o worsening shortness of breath. Diagnosed with COVID on 21. In the ED, he had a SPO2 of 80% on 10L. He was placed on 30L high flow nasal cannula, 100% FiO2 which improved oxygen saturation to 98%. He was then admitted to the hospital and started on Dexamethasone.  Not a candidate for remdesivir given increased FiO2 requirement.    Review of Systems: All systems were reviewed and negative except as mentioned in subjective, assessment and plan.    Vital Signs:  Temp:  [97.7 °F (36.5 °C)-98.6 °F (37 °C)] 98.6 °F (37 °C)  Heart Rate:  [52-78] 78  Resp:  [20] 20  BP: ()/(60-88) 96/64    Intake and output:  I/O last 3 completed shifts:  In:  [P.O.:2040]  Out: 2525 [Urine:2525]  I/O this shift:  In: 600 [P.O.:600]  Out: 1250 [Urine:1250]    Physical Examination:  General: NAD, resting in bed  HEENT: EOMI, NC/AT  Heart: Regular  Lungs: nonlabored  Abdomen: Soft, nondistended, nontender  Extremities: No edema  Neurological: A&O x3, moves all extremities  Psychological: Mood and affect appropriate, speech is coherent  Skin: warm, dry    Laboratory results:  Results from last 7 days   Lab Units 21  0601 21  0606 21  0637   SODIUM mmol/L 136 134* 134*   POTASSIUM mmol/L 3.7 4.7 4.5   CHLORIDE mmol/L 100 100 100   CO2 mmol/L 27.5 22.8 23.1   BUN mg/dL 19 21* 18   CREATININE mg/dL 0.73* 0.74* 0.75*   CALCIUM mg/dL 9.1 8.9 9.0   GLUCOSE mg/dL 99 131* 119*     Results from last 7 days    Lab Units 09/09/21  0601 09/06/21  0606 09/05/21  0637   WBC 10*3/mm3 10.19 6.74 7.07   HEMOGLOBIN g/dL 14.8 15.4 15.7   HEMATOCRIT % 42.4 43.5 43.7   PLATELETS 10*3/mm3 384 415 432     I have reviewed the patient's laboratory results.    Radiology results:No radiology results from the last 24 hrs  CT PE 8/31/2021: negative for PE     Medication Review: I have reviewed the patient's active and prn medications.     Problem List:  COVID-19    Assessment:  1. Acute Hypoxic Respiratory Failure   2. Severe COVID-19 Pneumonia, POA    Plan:  -Support on CPAP while resting and sleeping  -Completing steroids, low-dose diuretic    Nathan Joseph DO  09/09/21  14:59 EDT    Dictated utilizing Dragon dictation.

## 2021-09-10 PROCEDURE — 25010000002 ENOXAPARIN PER 10 MG: Performed by: EMERGENCY MEDICINE

## 2021-09-10 PROCEDURE — 99232 SBSQ HOSP IP/OBS MODERATE 35: CPT | Performed by: EMERGENCY MEDICINE

## 2021-09-10 PROCEDURE — 25010000002 FUROSEMIDE PER 20 MG: Performed by: EMERGENCY MEDICINE

## 2021-09-10 RX ADMIN — FUROSEMIDE 20 MG: 10 INJECTION, SOLUTION INTRAMUSCULAR; INTRAVENOUS at 08:23

## 2021-09-10 RX ADMIN — Medication 30 ML: at 08:22

## 2021-09-10 RX ADMIN — Medication 30 ML: at 20:28

## 2021-09-10 RX ADMIN — FAMOTIDINE 20 MG: 20 TABLET, FILM COATED ORAL at 08:25

## 2021-09-10 RX ADMIN — GUAIFENESIN AND DEXTROMETHORPHAN 15 ML: 100; 10 SYRUP ORAL at 08:22

## 2021-09-10 RX ADMIN — SODIUM CHLORIDE, PRESERVATIVE FREE 10 ML: 5 INJECTION INTRAVENOUS at 08:23

## 2021-09-10 RX ADMIN — SERTRALINE HYDROCHLORIDE 25 MG: 50 TABLET ORAL at 20:28

## 2021-09-10 RX ADMIN — ENOXAPARIN SODIUM 40 MG: 40 INJECTION SUBCUTANEOUS at 20:28

## 2021-09-10 RX ADMIN — Medication 10 MG: at 20:28

## 2021-09-10 RX ADMIN — SODIUM CHLORIDE, PRESERVATIVE FREE 10 ML: 5 INJECTION INTRAVENOUS at 20:28

## 2021-09-10 NOTE — PLAN OF CARE
Goal Outcome Evaluation:  Plan of Care Reviewed With: patient        Progress: no change  Outcome Summary: Patient continues to require 25L Airvo at 60%. SB on telemetry with HR dropping as low as 37 during the night. No other complaints at this time.

## 2021-09-10 NOTE — CASE MANAGEMENT/SOCIAL WORK
Continued Stay Note  [facility]     Patient Name: Alberto Torres  MRN: [id number]  Today's Date: 9/10/2021    Admit Date: 8/31/2021    Discharge Plan     Row Name 09/10/21 1423       Plan    Plan  spoke with pt by phone, stated has a pulse oximeter at home received from [facility]; no other needs at this time        Discharge Codes    No documentation.             Nataly Valencia RN

## 2021-09-10 NOTE — PROGRESS NOTES
[facility]    Name:  Alberto Torres   Age:  51 y.o.  Sex:  male  :  1970  MRN:  [id number]   Visit Number:  [id number]  Admission Date:  2021  Date Of Service:  09/10/21  Primary Care Physician:  Yajaira Saleh APRN     LOS: 10 days :    Chief Complaint:   Shortness of breath, COVID-19 Pneumonia    Subjective: He feels better. FiO2 remains at 40% with 40 L Airvo.    Hospital Course: 51 unvaccinated male who presented with c/o worsening shortness of breath. Diagnosed with COVID on 21. In the ED, he had a SPO2 of 80% on 10L. He was placed on 30L high flow nasal cannula, 100% FiO2 which improved oxygen saturation to 98%. He was then admitted to the hospital and started on Dexamethasone.  Not a candidate for remdesivir given increased FiO2 requirement.  He completed his course of steroids and has been diuresed.    Review of Systems: All systems were reviewed and negative except as mentioned in subjective, assessment and plan.    Vital Signs:  Temp:  [97.1 °F (36.2 °C)-98.2 °F (36.8 °C)] 98.2 °F (36.8 °C)  Heart Rate:  [41-67] 61  Resp:  [18-20] 18  BP: (111-129)/(70-76) 119/74    Intake and output:  I/O last 3 completed shifts:  In: 960 [P.O.:960]  Out: 2925 [Urine:2925]  I/O this shift:  In: -   Out: 900 [Urine:900]    Physical Examination:  General: NAD, resting in bed  HEENT: EOMI, NC/AT  Heart: Regular  Lungs: nonlabored  Abdomen: Soft, nondistended, nontender  Extremities: No edema  Neurological: A&O x3, moves all extremities  Psychological: Mood and affect appropriate, speech is coherent  Skin: warm, dry    Laboratory results:  Results from last 7 days   Lab Units 21  0601 21  0606 21  0637   SODIUM mmol/L 136 134* 134*   POTASSIUM mmol/L 3.7 4.7 4.5   CHLORIDE mmol/L 100 100 100   CO2 mmol/L 27.5 22.8 23.1   BUN mg/dL 19 21* 18   CREATININE mg/dL 0.73* 0.74* 0.75*   CALCIUM mg/dL 9.1 8.9 9.0   GLUCOSE mg/dL 99 131* 119*      Results from last 7 days   Lab Units 09/09/21  0601 09/06/21  0606 09/05/21  0637   WBC 10*3/mm3 10.19 6.74 7.07   HEMOGLOBIN g/dL 14.8 15.4 15.7   HEMATOCRIT % 42.4 43.5 43.7   PLATELETS 10*3/mm3 384 415 432     I have reviewed the patient's laboratory results.    Radiology results:No radiology results from the last 24 hrs  CT PE 8/31/2021: negative for PE     Medication Review: I have reviewed the patient's active and prn medications.     Problem List:  COVID-19    Assessment:  1. Acute Hypoxic Respiratory Failure   2. Severe COVID-19 Pneumonia, POA    Plan:  -Support on CPAP while resting and sleeping  -Discharge home when FiO2 is down to 4 LNC; may be stepped out of airborne isolation based off a 2321 testing on 9/12/2021    Nathan Joseph DO  09/10/21  12:35 EDT    Dictated utilizing Dragon dictation.

## 2021-09-10 NOTE — PLAN OF CARE
Goal Outcome Evaluation:  Plan of Care Reviewed With: patient        Progress: no change  Outcome Summary: Still requiring 40L AirVo2 att but very pleasant and patient.  Hopefully can wean off some tonight and tomorrow's shift.

## 2021-09-11 ENCOUNTER — READMISSION MANAGEMENT (OUTPATIENT)
Dept: CALL CENTER | Facility: HOSPITAL | Age: 51
End: 2021-09-11

## 2021-09-11 VITALS
HEART RATE: 55 BPM | WEIGHT: 170.19 LBS | DIASTOLIC BLOOD PRESSURE: 73 MMHG | TEMPERATURE: 97.9 F | BODY MASS INDEX: 22.56 KG/M2 | HEIGHT: 73 IN | OXYGEN SATURATION: 85 % | RESPIRATION RATE: 18 BRPM | SYSTOLIC BLOOD PRESSURE: 117 MMHG

## 2021-09-11 PROCEDURE — 99239 HOSP IP/OBS DSCHRG MGMT >30: CPT | Performed by: EMERGENCY MEDICINE

## 2021-09-11 RX ADMIN — SODIUM CHLORIDE, PRESERVATIVE FREE 10 ML: 5 INJECTION INTRAVENOUS at 10:11

## 2021-09-11 RX ADMIN — Medication 30 ML: at 09:57

## 2021-09-11 RX ADMIN — FAMOTIDINE 20 MG: 20 TABLET, FILM COATED ORAL at 09:56

## 2021-09-11 NOTE — CASE MANAGEMENT/SOCIAL WORK
Discharge Planning Assessment   [facility]     Patient Name: Alberto Torres  MRN: [id number]  Today's Date: 9/11/2021    Admit Date: 8/31/2021    Discharge Needs Assessment    No documentation.       Discharge Plan     Row Name 09/11/21 1721       Plan    Plan  Orders for discharge home with oxygen noted  Pt has pulse ox from earlier ED visit  Pt requested [employer] and facesheet, orders for O2@3L continuously and pulse ox reading on room air  (85%)  faxed with success  Ilya from [employer]  delivered O2 to 3rd floor  Pt discharged home with family        Continued Care and Services - Discharged on 9/11/2021 Admission date: 8/31/2021 - Discharge disposition: Home or Self Care   Coordination has not been started for this encounter.       Expected Discharge Date and Time     Expected Discharge Date Expected Discharge Time    Sep 11, 2021         Demographic Summary    No documentation.       Functional Status    No documentation.       Psychosocial    No documentation.       Abuse/Neglect    No documentation.       Legal    No documentation.       Substance Abuse    No documentation.       Patient Forms    No documentation.           Emely Pierre RN

## 2021-09-11 NOTE — PLAN OF CARE
Goal Outcome Evaluation:  Plan of Care Reviewed With: patient        Progress: no change  Outcome Summary: Rested well, VSS, continues to require 40L Airvo to maintain 02 sats <90%

## 2021-09-11 NOTE — OUTREACH NOTE
Prep Survey      Responses   [facility] discharged from?  [facility]   Is LACE score < 7 ?  No   Emergency Room discharge w/ pulse ox?  No   Eligibility  TCM   [facility]   Date of Admission  08/31/21   Date of Discharge  09/11/21   Discharge Disposition  Home or Self Care   Discharge diagnosis  covid 19   Does the patient have one of the following disease processes/diagnoses(primary or secondary)?  COVID-19   Does the patient have Home health ordered?  No   Is there a DME ordered?  Yes   What DME was ordered?  [employer] 3L oxygen   Prep survey completed?  Yes          Leilani Mcdaniel RN

## 2021-09-11 NOTE — PLAN OF CARE
Goal Outcome Evaluation:  Plan of Care Reviewed With: patient, family           Outcome Summary: Patient discharged home with discharged instructions with verbalized understanding

## 2021-09-11 NOTE — DISCHARGE SUMMARY
[facility]      Name:  Alberto Torres   Age:  51 y.o.  Sex:  male  :  1970  MRN:  [id number]   Visit Number:  [id number]    Admission Date:  2021  Date of Discharge:  2021  Primary Care Physician:  Yajaira Saleh APRN    Discharge Diagnoses:   1. Acute Hypoxic Respiratory Failure   2. Severe COVID-19 Pneumonia, POA    Problem List:   COVID-19    Presenting Problem:COVID-19 [U07.1]   Consults:   Consults     Date and Time Order Name Status Description    2021  8:33 AM Inpatient Cardiology Consult Completed         Hospital Course: 51 unvaccinated male who presented with c/o worsening shortness of breath. Dx with COVID on 21. In the ED, he had a SPO2 of 80% on 10L. He was placed on 30L high flow nasal cannula, 100% FiO2 which improved oxygen saturation to 98%. He was admitted to the hospital and started on Dexamethasone. Not a candidate for remdesivir given increased FiO2 requirement. He completed his course of steroids and has been diuresed.  Today, he is down to 3.5 LNC and will be discharged home with the same as he is eager to go home.  He has a pulse oximeter and I told him to monitor and keep it above 88% at rest.  If it dips below, return to the ED.    Vital Signs:  Temp:  [97.6 °F (36.4 °C)-98.3 °F (36.8 °C)] 97.9 °F (36.6 °C)  Heart Rate:  [51-61] 55  Resp:  [18] 18  BP: (109-122)/(62-75) 117/73    Physical Examination:  General: NAD, resting in bed  HEENT: EOMI, NC/AT  Heart: Regular  Lungs: nonlabored  Abdomen: Soft, nondistended, nontender  Extremities: No edema  Neurological: A&O x3, moves all extremities  Psychological: Mood and affect appropriate, speech is coherent  Skin: warm, dry     Pertinent Lab Results:   Results from last 7 days   Lab Units 21  0601 21  0606 21  0637   SODIUM mmol/L 136 134* 134*   POTASSIUM mmol/L 3.7 4.7 4.5   CHLORIDE mmol/L 100 100 100   CO2 mmol/L 27.5 22.8 23.1   BUN mg/dL 19  21* 18   CREATININE mg/dL 0.73* 0.74* 0.75*   CALCIUM mg/dL 9.1 8.9 9.0   GLUCOSE mg/dL 99 131* 119*     Results from last 7 days   Lab Units 09/09/21  0601 09/06/21  0606 09/05/21  0637   WBC 10*3/mm3 10.19 6.74 7.07   HEMOGLOBIN g/dL 14.8 15.4 15.7   HEMATOCRIT % 42.4 43.5 43.7   PLATELETS 10*3/mm3 384 415 432     Pertinent Radiology Results  Imaging Results (All)     Procedure Component Value Units Date/Time    CT Chest Pulmonary Embolism [[id number]] Collected: 08/31/21 2240     Updated: 08/31/21 2241    Narrative:      FINAL REPORT  TECHNIQUE: Thin section axial images were obtained through the chest and during the arterial phase of IV contrast administration. Coronal 3-D MIP reconstructed images were also provided.   CLINICAL HISTORY:PE suspected, high prob, covid +  FINDINGS:  The pulmonary arteries are well-opacified and there is no filling defect to indicate pulmonary embolism. The thoracic  aorta is normal. There are multifocal bilateral patchy areas of roundglass/airspace opacity consistent with pneumonia,  including viral pneumonia. There is no pleural disease,  denopathy or significant osseous abnormality.     Impression:      Viral pneumonia. No pulmonary embolism.    Authenticated by Reinaldo Bautista M.D. on 08/31/2021 10:40:07 PM    XR Chest 1 View [[id number]] Collected: 08/31/21 1402     Updated: 08/31/21 1405    Narrative:      PROCEDURE: XR CHEST 1 VW-   HISTORY: SOA Triage Protocol   COMPARISON:  None   FINDINGS:  Portable view of the chest demonstrates multifocal airspace opacities, right greater than left suspicious for pneumonia, likely viral. There is no evidence of effusion, pneumothorax or other significant pleural disease. The mediastinum is unremarkable.  The heart size is normal.       Impression:      Findings suspicious for pneumonia, likely viral      This report was finalized on 8/31/2021 2:03 PM by Amol Tan MD.        Condition on Discharge:  Stable.  Code status during the hospital  stay:  Code Status and Medical Interventions:   Ordered at: 08/31/21 1716     Level Of Support Discussed With:    Patient     Code Status:    CPR     Medical Interventions (Level of Support Prior to Arrest):    Full     Discharge Disposition:Home or Self Care    Discharge Medications:     Discharge Medications      Continue These Medications      Instructions Start Date   FISH OIL OMEGA-3 PO   Oral      ibuprofen 200 MG tablet  Commonly known as: ADVIL,MOTRIN   200 mg, Oral, Every 6 Hours PRN      mupirocin 2 % ointment  Commonly known as: BACTROBAN   1 application, As Needed      promethazine 6.25 MG/5ML syrup  Commonly known as: PHENERGAN   6.25 mg, Oral, 3 Times Daily, For 3 days (beginning 8-30-21)          Stop These Medications    azithromycin 250 MG tablet  Commonly known as: ZITHROMAX     dexamethasone 0.5 MG tablet  Commonly known as: DECADRON        Discharge Diet: regular    Activity at Discharge:   Activity Instructions     Activity as Tolerated          Follow-up Appointments:  Follow-up Information     Yajaira Saleh APRN Follow up in 1 week(s).    Specialties: Nurse Practitioner, Family Medicine  Contact information:  [address] [place] [address] [phone]                 No future appointments.     Nathan Joseph DO  09/11/21  12:24 EDT    Time: I spent 35 minutes on this discharge activity which included: face-to-face encounter with the patient, reviewing the data in the system, coordination of the care with the nursing staff as well as consultants, documentation, and entering orders.     Dictated utilizing Dragon dictation.

## 2021-09-11 NOTE — PROGRESS NOTES
Exercise Oximetry    Patient Name:Alberto Torres   MRN: [id number]   Date: 09/11/21             ROOM AIR BASELINE   SpO2% 85   Heart Rate 67   Blood Pressure 117/73     EXERCISE ON ROOM AIR SpO2% EXERCISE ON O2 @  LPM SpO2%   1 MINUTE X 1 MINUTE    2 MINUTES X 2 MINUTES    3 MINUTES X 3 MINUTES    4 MINUTES X 4 MINUTES    5 MINUTES X 5 MINUTES    6 MINUTES X 6 MINUTES               Distance Walked X Distance Walked   Dyspnea (Kasia Scale)  X Dyspnea (Kasia Scale)   Fatigue (Kasia Scale)  X Fatigue (Kasia Scale)   SpO2% Post Exercise  X SpO2% Post Exercise   HR Post Exercise  X HR Post Exercise   Time to Recovery  X Time to Recovery     Comments: Room Air SPO2 85% oxygen 3.5 liters required at this time.

## 2021-09-12 ENCOUNTER — TRANSITIONAL CARE MANAGEMENT TELEPHONE ENCOUNTER (OUTPATIENT)
Dept: CALL CENTER | Facility: HOSPITAL | Age: 51
End: 2021-09-12

## 2021-09-12 NOTE — OUTREACH NOTE
Call Center TCM Note      Responses   [facility] patient discharged from?  [facility]   Does the patient have one of the following disease processes/diagnoses(primary or secondary)?  COVID-19   COVID-19 underlying condition?  None   TCM attempt successful?  Yes   Call start time  1220   Call end time  1224   Discharge diagnosis  covid 19   Meds reviewed with patient/caregiver?  Yes   Is the patient having any side effects they believe may be caused by any medication additions or changes?  No   Does the patient have all medications ordered at discharge?  N/A   Is the patient taking all medications as directed (includes completed medication regime)?  Yes   Does the patient have a primary care provider?   Yes   Comments regarding PCP  HOSP DC FU - COVID on 9/17/21 @ 2:15pm   Does the patient have an appointment with their PCP or specialist within 7 days of discharge?  Yes   Has the patient kept scheduled appointments due by today?  N/A   What DME was ordered?  [employer] 3L oxygen   Has all DME been delivered?  Yes   Did the patient receive a copy of their discharge instructions?  Yes   Did the patient receive a copy of COVID-19 specific instructions?  Yes   Nursing interventions  Reviewed instructions with patient   What is the patient's perception of their health status since discharge?  Improving   Does the patient have any of the following symptoms?  Cough   Nursing Interventions  Nurse provided patient education   Pulse Ox monitoring  Intermittent   Pulse Ox device source  Patient   O2 Sat comments  O2 sats 93-94% o2 @ 3L   O2 Sat: education provided  Sat levels, Monitoring frequency, When to seek care   If the patient is a current smoker, are they able to teach back resources for cessation?  Not a smoker   Is the patient/caregiver able to teach back the hierarchy of who to call/visit for symptoms/problems? PCP, Specialist, Home health nurse, Urgent Care, ED, 911  Yes   TCM call completed?  Yes   Wrap up  additional comments  Pt states he is feeling great. Does have cough at night.           Cyndie Powell RN    9/12/2021, 12:27 EDT

## 2021-09-13 ENCOUNTER — READMISSION MANAGEMENT (OUTPATIENT)
Dept: CALL CENTER | Facility: HOSPITAL | Age: 51
End: 2021-09-13

## 2021-09-13 ENCOUNTER — TELEPHONE (OUTPATIENT)
Dept: TELEMETRY | Facility: HOSPITAL | Age: 51
End: 2021-09-13

## 2021-09-13 NOTE — OUTREACH NOTE
COVID-19 Week 1 Survey      Responses   [facility] patient discharged from?  [facility]   Does the patient have one of the following disease processes/diagnoses(primary or secondary)?  COVID-19   COVID-19 underlying condition?  None [Acute Hypoxic Respiratory Failure ]   Call Number  Call 2   Week 1 Call successful?  Yes   Call start time  0825   Call end time  0830   Discharge diagnosis  Severe COVID-19 Pneumonia, POA   Does the patient have a primary care provider?   Yes   Comments  appointment for 2p Friday   What is the patient's perception of their health status since discharge?  Improving [remains  weak]   Pulse Ox monitoring  Continuous   Pulse Ox device source  Hospital   O2 Sat comments  sats  mid to high 90's with oxygen   Is the patient/caregiver able to teach back steps to recovery at home?  Weigh daily, Set small, achievable goals for return to baseline health, Rest and rebuild strength, gradually increase activity [remains weak, tires easily. has lost 20# but has gained 3 back. ]   COVID-19 call completed?  Yes   Wrap up additional comments  fatigues easily, monitors sats frequently.           Thania Boston RN

## 2021-09-13 NOTE — TELEPHONE ENCOUNTER
This pt was assigned to me when I worked in [place].  He lives in [place].  He is scheduled with me on 9/17.  Would he prefer to have an appt with the [facility] rather than being seen by me here in [place]?

## 2021-09-13 NOTE — PAYOR COMM NOTE
"Negro De La Fuente (51 y.o. Male)     Date of Birth Social Security Number Address Home Phone MRN    1970  [address] [place] [address] [phone] [id number]    Tenriism Marital Status          None        Admission Date Admission Type Admitting Provider Attending Provider Department, Room/Bed    21 Emergency Nathan Joseph DO  [facility] MED SURG  3, 326/1    Discharge Date Discharge Disposition Discharge Destination        2021 Home or Self Care              Attending Provider: (none)   Allergies: No Known Allergies    Isolation: None   Infection: COVID (confirmed) (21)   Code Status: Prior    Ht: 185.4 cm (73\")   Wt: 77.2 kg (170 lb 3.1 oz)    Admission Cmt: None   Principal Problem: COVID-19 [U07.1]                 Active Insurance as of 2021     Primary Coverage     Payor Plan Insurance Group Employer/Plan Group    [employer] BLUE [employer]OSS [employer] BLUE CROSS BLUE SHIELD PPO [id number]     Payor Plan Address Payor Plan Phone Number Payor Plan Fax Number Effective Dates    [address] [phone]  2008 - None Entered    [place] [address]       Subscriber Name Subscriber Birth Date Member ID       NEGRO DE LA FUENTE 1970 [id number]                 Emergency Contacts      (Rel.) Home Phone Work Phone Mobile Phone    AMERICA SANDOVAL (Sister) [phone] -- --               Discharge Summary      Nathan Joseph DO at 21 1224              [facility] HOSPITALIST   DISCHARGE SUMMARY      Name:  Negro De La Fuente   Age:  51 y.o.  Sex:  male  :  1970  MRN:  [id number]   Visit Number:  [id number]    Admission Date:  2021  Date of Discharge:  2021  Primary Care Physician:  Yajaira Saleh APRN    Discharge Diagnoses:   1. Acute Hypoxic Respiratory Failure   2. Severe COVID-19 Pneumonia, POA    Problem List:   COVID-19    Presenting Problem:COVID-19 [U07.1]   Consults:   Consults     Date and Time Order " Name Status Description    9/7/2021  8:33 AM Inpatient Cardiology Consult Completed         Hospital Course: 51 unvaccinated male who presented with c/o worsening shortness of breath. Dx with COVID on 8/23/21. In the ED, he had a SPO2 of 80% on 10L. He was placed on 30L high flow nasal cannula, 100% FiO2 which improved oxygen saturation to 98%. He was admitted to the hospital and started on Dexamethasone. Not a candidate for remdesivir given increased FiO2 requirement. He completed his course of steroids and has been diuresed.  Today, he is down to 3.5 LNC and will be discharged home with the same as he is eager to go home.  He has a pulse oximeter and I told him to monitor and keep it above 88% at rest.  If it dips below, return to the ED.    Vital Signs:  Temp:  [97.6 °F (36.4 °C)-98.3 °F (36.8 °C)] 97.9 °F (36.6 °C)  Heart Rate:  [51-61] 55  Resp:  [18] 18  BP: (109-122)/(62-75) 117/73    Physical Examination:  General: NAD, resting in bed  HEENT: EOMI, NC/AT  Heart: Regular  Lungs: nonlabored  Abdomen: Soft, nondistended, nontender  Extremities: No edema  Neurological: A&O x3, moves all extremities  Psychological: Mood and affect appropriate, speech is coherent  Skin: warm, dry     Pertinent Lab Results:   Results from last 7 days   Lab Units 09/09/21  0601 09/06/21  0606 09/05/21  0637   SODIUM mmol/L 136 134* 134*   POTASSIUM mmol/L 3.7 4.7 4.5   CHLORIDE mmol/L 100 100 100   CO2 mmol/L 27.5 22.8 23.1   BUN mg/dL 19 21* 18   CREATININE mg/dL 0.73* 0.74* 0.75*   CALCIUM mg/dL 9.1 8.9 9.0   GLUCOSE mg/dL 99 131* 119*     Results from last 7 days   Lab Units 09/09/21  0601 09/06/21  0606 09/05/21  0637   WBC 10*3/mm3 10.19 6.74 7.07   HEMOGLOBIN g/dL 14.8 15.4 15.7   HEMATOCRIT % 42.4 43.5 43.7   PLATELETS 10*3/mm3 384 978 432     Pertinent Radiology Results  Imaging Results (All)     Procedure Component Value Units Date/Time    CT Chest Pulmonary Embolism [[id number]] Collected: 08/31/21 2240     Updated:  08/31/21 2241    Narrative:      FINAL REPORT  TECHNIQUE: Thin section axial images were obtained through the chest and during the arterial phase of IV contrast administration. Coronal 3-D MIP reconstructed images were also provided.   CLINICAL HISTORY:PE suspected, high prob, covid +  FINDINGS:  The pulmonary arteries are well-opacified and there is no filling defect to indicate pulmonary embolism. The thoracic  aorta is normal. There are multifocal bilateral patchy areas of roundglass/airspace opacity consistent with pneumonia,  including viral pneumonia. There is no pleural disease,  denopathy or significant osseous abnormality.     Impression:      Viral pneumonia. No pulmonary embolism.    Authenticated by Reinaldo Bautista M.D. on 08/31/2021 10:40:07 PM    XR Chest 1 View [[id number]] Collected: 08/31/21 1402     Updated: 08/31/21 1405    Narrative:      PROCEDURE: XR CHEST 1 VW-   HISTORY: SOA Triage Protocol   COMPARISON:  None   FINDINGS:  Portable view of the chest demonstrates multifocal airspace opacities, right greater than left suspicious for pneumonia, likely viral. There is no evidence of effusion, pneumothorax or other significant pleural disease. The mediastinum is unremarkable.  The heart size is normal.       Impression:      Findings suspicious for pneumonia, likely viral      This report was finalized on 8/31/2021 2:03 PM by Amol Tan MD.        Condition on Discharge:  Stable.  Code status during the hospital stay:  Code Status and Medical Interventions:   Ordered at: 08/31/21 1716     Level Of Support Discussed With:    Patient     Code Status:    CPR     Medical Interventions (Level of Support Prior to Arrest):    Full     Discharge Disposition:Home or Self Care    Discharge Medications:     Discharge Medications      Continue These Medications      Instructions Start Date   FISH OIL OMEGA-3 PO   Oral      ibuprofen 200 MG tablet  Commonly known as: ADVIL,MOTRIN   200 mg, Oral, Every 6 Hours  PRN      mupirocin 2 % ointment  Commonly known as: BACTROBAN   1 application, As Needed      promethazine 6.25 MG/5ML syrup  Commonly known as: PHENERGAN   6.25 mg, Oral, 3 Times Daily, For 3 days (beginning 8-30-21)          Stop These Medications    azithromycin 250 MG tablet  Commonly known as: ZITHROMAX     dexamethasone 0.5 MG tablet  Commonly known as: DECADRON        Discharge Diet: regular    Activity at Discharge:   Activity Instructions     Activity as Tolerated          Follow-up Appointments:  Follow-up Information     Yajaira Saleh APRN Follow up in 1 week(s).    Specialties: Nurse Practitioner, Family Medicine  Contact information:  [address] [address] [phone]                 No future appointments.     Nathan Joseph DO  09/11/21  12:24 EDT    Time: I spent 35 minutes on this discharge activity which included: face-to-face encounter with the patient, reviewing the data in the system, coordination of the care with the nursing staff as well as consultants, documentation, and entering orders.     Dictated utilizing Dragon dictation.        Electronically signed by Nathan Joseph DO at 09/11/21 6647

## 2021-09-13 NOTE — TELEPHONE ENCOUNTER
Called and spoke with pt. Pt states he would rather stay in [place] if possible. Would he just need to call the office there and schedule an appointment?

## 2021-09-13 NOTE — PAYOR COMM NOTE
"Negro De La Fuente (51 y.o. Male)     Date of Birth Social Security Number Address Home Phone MRN    1970  [address] [place] [address] [phone] [id number]    Catholic Marital Status          None        Admission Date Admission Type Admitting Provider Attending Provider Department, Room/Bed    21 Emergency Nathan Joseph DO  [facility] MED SURG  3, 326/1    Discharge Date Discharge Disposition Discharge Destination        2021 Home or Self Care              Attending Provider: (none)   Allergies: No Known Allergies    Isolation: None   Infection: COVID (confirmed) (21)   Code Status: Prior    Ht: 185.4 cm (73\")   Wt: 77.2 kg (170 lb 3.1 oz)    Admission Cmt: None   Principal Problem: COVID-19 [U07.1]                 Active Insurance as of 2021     Primary Coverage     Payor Plan Insurance Group Employer/Plan Group    [employer] BLUE [employer]OSS [employer] BLUE CROSS BLUE SHIELD PPO [id number]     Payor Plan Address Payor Plan Phone Number Payor Plan Fax Number Effective Dates    [address] [phone]  2008 - None Entered    [place] [address]       Subscriber Name Subscriber Birth Date Member ID       NERGO DE LA FUENTE 1970 [id number]                 Emergency Contacts      (Rel.) Home Phone Work Phone Mobile Phone    AMERICA SANDOVAL (Sister) [phone] -- --               Discharge Summary      Nathan Joseph DO at 21 1224              [facility] HOSPITALIST   DISCHARGE SUMMARY      Name:  Negro De La Fuente   Age:  51 y.o.  Sex:  male  :  1970  MRN:  [id number]   Visit Number:  [id number]    Admission Date:  2021  Date of Discharge:  2021  Primary Care Physician:  Yajaira Saleh APRN    Discharge Diagnoses:   1. Acute Hypoxic Respiratory Failure   2. Severe COVID-19 Pneumonia, POA    Problem List:   COVID-19    Presenting Problem:COVID-19 [U07.1]   Consults:   Consults     Date and Time Order " Name Status Description    9/7/2021  8:33 AM Inpatient Cardiology Consult Completed         Hospital Course: 51 unvaccinated male who presented with c/o worsening shortness of breath. Dx with COVID on 8/23/21. In the ED, he had a SPO2 of 80% on 10L. He was placed on 30L high flow nasal cannula, 100% FiO2 which improved oxygen saturation to 98%. He was admitted to the hospital and started on Dexamethasone. Not a candidate for remdesivir given increased FiO2 requirement. He completed his course of steroids and has been diuresed.  Today, he is down to 3.5 LNC and will be discharged home with the same as he is eager to go home.  He has a pulse oximeter and I told him to monitor and keep it above 88% at rest.  If it dips below, return to the ED.    Vital Signs:  Temp:  [97.6 °F (36.4 °C)-98.3 °F (36.8 °C)] 97.9 °F (36.6 °C)  Heart Rate:  [51-61] 55  Resp:  [18] 18  BP: (109-122)/(62-75) 117/73    Physical Examination:  General: NAD, resting in bed  HEENT: EOMI, NC/AT  Heart: Regular  Lungs: nonlabored  Abdomen: Soft, nondistended, nontender  Extremities: No edema  Neurological: A&O x3, moves all extremities  Psychological: Mood and affect appropriate, speech is coherent  Skin: warm, dry     Pertinent Lab Results:   Results from last 7 days   Lab Units 09/09/21  0601 09/06/21  0606 09/05/21  0637   SODIUM mmol/L 136 134* 134*   POTASSIUM mmol/L 3.7 4.7 4.5   CHLORIDE mmol/L 100 100 100   CO2 mmol/L 27.5 22.8 23.1   BUN mg/dL 19 21* 18   CREATININE mg/dL 0.73* 0.74* 0.75*   CALCIUM mg/dL 9.1 8.9 9.0   GLUCOSE mg/dL 99 131* 119*     Results from last 7 days   Lab Units 09/09/21  0601 09/06/21  0606 09/05/21  0637   WBC 10*3/mm3 10.19 6.74 7.07   HEMOGLOBIN g/dL 14.8 15.4 15.7   HEMATOCRIT % 42.4 43.5 43.7   PLATELETS 10*3/mm3 384 740 432     Pertinent Radiology Results  Imaging Results (All)     Procedure Component Value Units Date/Time    CT Chest Pulmonary Embolism [[id number]] Collected: 08/31/21 2240     Updated:  08/31/21 2241    Narrative:      FINAL REPORT  TECHNIQUE: Thin section axial images were obtained through the chest and during the arterial phase of IV contrast administration. Coronal 3-D MIP reconstructed images were also provided.   CLINICAL HISTORY:PE suspected, high prob, covid +  FINDINGS:  The pulmonary arteries are well-opacified and there is no filling defect to indicate pulmonary embolism. The thoracic  aorta is normal. There are multifocal bilateral patchy areas of roundglass/airspace opacity consistent with pneumonia,  including viral pneumonia. There is no pleural disease,  denopathy or significant osseous abnormality.     Impression:      Viral pneumonia. No pulmonary embolism.    Authenticated by Reinaldo Bautista M.D. on 08/31/2021 10:40:07 PM    XR Chest 1 View [[id number]] Collected: 08/31/21 1402     Updated: 08/31/21 1405    Narrative:      PROCEDURE: XR CHEST 1 VW-   HISTORY: SOA Triage Protocol   COMPARISON:  None   FINDINGS:  Portable view of the chest demonstrates multifocal airspace opacities, right greater than left suspicious for pneumonia, likely viral. There is no evidence of effusion, pneumothorax or other significant pleural disease. The mediastinum is unremarkable.  The heart size is normal.       Impression:      Findings suspicious for pneumonia, likely viral      This report was finalized on 8/31/2021 2:03 PM by Amol Tan MD.        Condition on Discharge:  Stable.  Code status during the hospital stay:  Code Status and Medical Interventions:   Ordered at: 08/31/21 1716     Level Of Support Discussed With:    Patient     Code Status:    CPR     Medical Interventions (Level of Support Prior to Arrest):    Full     Discharge Disposition:Home or Self Care    Discharge Medications:     Discharge Medications      Continue These Medications      Instructions Start Date   FISH OIL OMEGA-3 PO   Oral      ibuprofen 200 MG tablet  Commonly known as: ADVIL,MOTRIN   200 mg, Oral, Every 6 Hours  PRN      mupirocin 2 % ointment  Commonly known as: BACTROBAN   1 application, As Needed      promethazine 6.25 MG/5ML syrup  Commonly known as: PHENERGAN   6.25 mg, Oral, 3 Times Daily, For 3 days (beginning 8-30-21)          Stop These Medications    azithromycin 250 MG tablet  Commonly known as: ZITHROMAX     dexamethasone 0.5 MG tablet  Commonly known as: DECADRON        Discharge Diet: regular    Activity at Discharge:   Activity Instructions     Activity as Tolerated          Follow-up Appointments:  Follow-up Information     Yajaira Saleh APRN Follow up in 1 week(s).    Specialties: Nurse Practitioner, Family Medicine  Contact information:  [address] [address] [phone]                 No future appointments.     Nathan Joseph DO  09/11/21  12:24 EDT    Time: I spent 35 minutes on this discharge activity which included: face-to-face encounter with the patient, reviewing the data in the system, coordination of the care with the nursing staff as well as consultants, documentation, and entering orders.     Dictated utilizing Dragon dictation.        Electronically signed by Nathan Joseph DO at 09/11/21 9243

## 2021-09-14 ENCOUNTER — READMISSION MANAGEMENT (OUTPATIENT)
Dept: CALL CENTER | Facility: HOSPITAL | Age: 51
End: 2021-09-14

## 2021-09-14 NOTE — OUTREACH NOTE
COVID-19 Week 1 Survey      Responses   [facility] patient discharged from?  [facility]   Does the patient have one of the following disease processes/diagnoses(primary or secondary)?  COVID-19   COVID-19 underlying condition?  None   Call Number  Call 3   Week 1 Call successful?  Yes   Call start time  1215   Call end time  1225   Discharge diagnosis  Severe COVID-19 Pneumonia, POA   Is the patient taking all medications as directed (includes completed medication regime)?  Yes   Does the patient have a primary care provider?   Yes   Comments regarding PCP  Appt with Dr. Jalil Hilliard at 10:30.   Does the patient have an appointment with their PCP or specialist within 7 days of discharge?  Yes   Has the patient kept scheduled appointments due by today?  N/A   What DME was ordered?  [employer] 3L oxygen   Has all DME been delivered?  Yes   Psychosocial issues?  No   What is the patient's perception of their health status since discharge?  Improving   Does the patient have any of the following symptoms?  Cough, Shortness of breath [SOB with activity]   Nursing Interventions  Nurse provided patient education   Pulse Ox monitoring  Intermittent   Pulse Ox device source  Hospital   O2 Sat comments  Using 3L with sats at 92-93%   O2 Sat: education provided  Sat levels, Monitoring frequency, When to seek care   O2 Sat education comments  If sats drop below 90 while at rest and with oxygen needs to go back to ED   Is the patient/caregiver able to teach back steps to recovery at home?  Set small, achievable goals for return to baseline health, Rest and rebuild strength, gradually increase activity, Eat a well-balance diet, Make a list of questions for provider's appointment   If the patient is a current smoker, are they able to teach back resources for cessation?  Not a smoker   Is the patient/caregiver able to teach back the hierarchy of who to call/visit for symptoms/problems? PCP, Specialist, Home health nurse, Urgent  [facility], ED, 911  Yes   COVID-19 call completed?  Yes   Wrap up additional comments  States he is weak and has fatigue.  Pt talked about previously ran 2 miles a day, went to the gym and ate health and was surprised by how this has effected him.  Voiced his frustration on not getting his energy back.  Advised to slowly increase activity and this can be a slow recovery process.           Dana Calvillo LPN

## 2021-09-14 NOTE — TELEPHONE ENCOUNTER
Contacted patient and advised him to contact [facility] to schedule an upcoming appt at their location to be evaluated by PCP

## 2021-09-16 ENCOUNTER — OFFICE VISIT (OUTPATIENT)
Dept: INTERNAL MEDICINE | Facility: CLINIC | Age: 51
End: 2021-09-16

## 2021-09-16 VITALS
DIASTOLIC BLOOD PRESSURE: 58 MMHG | WEIGHT: 168 LBS | TEMPERATURE: 97.7 F | SYSTOLIC BLOOD PRESSURE: 100 MMHG | OXYGEN SATURATION: 96 % | HEART RATE: 88 BPM | HEIGHT: 73 IN | BODY MASS INDEX: 22.26 KG/M2

## 2021-09-16 DIAGNOSIS — U07.1 COVID-19: Primary | ICD-10-CM

## 2021-09-16 PROCEDURE — 99213 OFFICE O/P EST LOW 20 MIN: CPT | Performed by: INTERNAL MEDICINE

## 2021-09-16 NOTE — PROGRESS NOTES
Transitional Care Follow Up Visit  Subjective     Alberto Torres is a 51 y.o. male who presents for a transitional care management visit.    Within 48 business hours after discharge our office contacted him via telephone to coordinate his care and needs.      I reviewed and discussed the details of that call along with the discharge summary, hospital problems, inpatient lab results, inpatient diagnostic studies, and consultation reports with Alberto.     Current outpatient and discharge medications have been reconciled for the patient.  Reviewed by: Dinesh Jimenes MD      Date of TCM Phone Call 9/11/2021   [facility]   Date of Admission 8/31/2021   Date of Discharge 9/11/2021   Discharge Disposition Home or Self Care     Risk for Readmission (LACE) Score: 8 (9/11/2021  6:01 AM)      History of Present Illness   Course During Hospital Stay: 51-year-old unvaccinated male who presented to the emergency room with worsening shortness of breath was diagnosed with Covid on August 23.  Inner the emergency room on evaluation he he was noted to have an O2 sat of 80% on 10 L via nasal cannula he was admitted for further management.  He was placed on high flow oxygen, dexamethasone with gradual improvement noted during his prolonged stay of about 2 weeks.  Upon discharge he was on to 3.5 L via nasal cannula.  Today he is coming in accompanied by his wife was giving us some of the history still has severe fatigue however every day has had some gradual improvement.  He denies fever or chills does not smoke.  Has good oral intake.  Still has some space disturbances.  Denies headaches or visual disturbances  The following portions of the patient's history were reviewed and updated as appropriate: allergies, current medications, past family history, past medical history, past social history, past surgical history and problem list.    Review of Systems   Constitutional: Positive for fatigue. Negative for activity change,  appetite change and fever.   HENT: Negative for congestion, ear discharge, ear pain and trouble swallowing.    Eyes: Negative for photophobia and visual disturbance.   Respiratory: Positive for cough and shortness of breath.    Cardiovascular: Negative for chest pain and palpitations.   Gastrointestinal: Negative for abdominal distention, abdominal pain, constipation, diarrhea, nausea and vomiting.   Endocrine: Negative.    Genitourinary: Negative for dysuria, hematuria and urgency.   Musculoskeletal: Positive for arthralgias. Negative for back pain, joint swelling and myalgias.   Skin: Negative for color change and rash.   Allergic/Immunologic: Negative.    Neurological: Negative for dizziness, weakness, light-headedness and headaches.   Hematological: Negative for adenopathy. Does not bruise/bleed easily.   Psychiatric/Behavioral: Negative for agitation, confusion and dysphoric mood. The patient is not nervous/anxious.        Objective   Physical Exam  Constitutional:       General: He is not in acute distress.     Appearance: He is well-developed.   HENT:      Nose: Nose normal.   Eyes:      General: No scleral icterus.     Conjunctiva/sclera: Conjunctivae normal.   Neck:      Thyroid: No thyromegaly.      Trachea: No tracheal deviation.   Cardiovascular:      Rate and Rhythm: Normal rate and regular rhythm.      Heart sounds: No murmur heard.   No friction rub.   Pulmonary:      Effort: No respiratory distress.      Breath sounds: Rales present. No wheezing.   Abdominal:      General: There is no distension.      Palpations: Abdomen is soft. There is no mass.      Tenderness: There is no abdominal tenderness. There is no guarding.   Musculoskeletal:         General: No deformity. Normal range of motion.   Lymphadenopathy:      Cervical: No cervical adenopathy.   Skin:     General: Skin is warm and dry.      Findings: No erythema or rash.   Neurological:      Mental Status: He is alert and oriented to person,  place, and time.      Cranial Nerves: No cranial nerve deficit.      Coordination: Coordination normal.      Deep Tendon Reflexes: Reflexes are normal and symmetric.   Psychiatric:         Behavior: Behavior normal.         Thought Content: Thought content normal.         Judgment: Judgment normal.         Assessment/Plan   Diagnoses and all orders for this visit:    1. COVID-19 (Primary) showing slow gradual improvement O2 sat on oxygen supplement okay continue with conservative measures now discussed Covid vaccine once he has improved.  Encourage fluid intake.  Follow-up again in 2 to 3 weeks

## 2021-09-20 ENCOUNTER — READMISSION MANAGEMENT (OUTPATIENT)
Dept: CALL CENTER | Facility: HOSPITAL | Age: 51
End: 2021-09-20

## 2021-09-20 NOTE — OUTREACH NOTE
COVID-19 Week 2 Survey      Responses   [facility] patient discharged from?  [facility]   Does the patient have one of the following disease processes/diagnoses(primary or secondary)?  COVID-19   COVID-19 underlying condition?  None   Call Number  Call 1   COVID-19 Week 2: Call 1 attempt successful?  No   Discharge diagnosis  Severe COVID-19 Pneumonia, POA          Ethel Vazquez RN

## 2021-09-27 ENCOUNTER — READMISSION MANAGEMENT (OUTPATIENT)
Dept: CALL CENTER | Facility: HOSPITAL | Age: 51
End: 2021-09-27

## 2021-09-27 NOTE — OUTREACH NOTE
COVID-19 Week 3 Survey      Responses   [facility] patient discharged from?  [facility]   Does the patient have one of the following disease processes/diagnoses(primary or secondary)?  COVID-19   COVID-19 underlying condition?  None   Call Number  Call 1   COVID-19 Week 3: Call 1 attempt successful?  Yes   Call start time  1231   Call end time  1236   Discharge diagnosis  Severe COVID-19 Pneumonia, POA   Meds reviewed with patient/caregiver?  Yes   Is the patient having any side effects they believe may be caused by any medication additions or changes?  No   Does the patient have all medications ordered at discharge?  Yes   Is the patient taking all medications as directed (includes completed medication regime)?  Yes   Does the patient have an appointment with their PCP or specialist within 7 days of discharge?  Yes   Has the patient kept scheduled appointments due by today?  Yes   Comments  10/08/2021 second f/u appt with PCP   Has home health visited the patient within 72 hours of discharge?  N/A   What DME was ordered?  O2 per [employer]   Psychosocial issues?  No   Nursing interventions  Reviewed instructions with patient   What is the patient's perception of their health status since discharge?  Improving   Does the patient have any of the following symptoms?  -- [Fatigues easily.]   Nursing Interventions  Nurse provided patient education   Pulse Ox monitoring  Intermittent   Pulse Ox device source  Hospital   O2 Sat comments  At rest 95-96% RA.   Has not used O2 in about 7 days   O2 Sat: education provided  Sat levels, Monitoring frequency, When to seek care   Is the patient/caregiver able to teach back the hierarchy of who to call/visit for symptoms/problems? PCP, Specialist, Home health nurse, Urgent Care, ED, 911  Yes   COVID-19 call completed?  Yes          Guerline Varela RN

## 2021-10-04 ENCOUNTER — READMISSION MANAGEMENT (OUTPATIENT)
Dept: CALL CENTER | Facility: HOSPITAL | Age: 51
End: 2021-10-04

## 2021-10-04 NOTE — OUTREACH NOTE
COVID-19 Week 4 Survey      Responses   [facility] patient discharged from?  [facility]   Does the patient have one of the following disease processes/diagnoses(primary or secondary)?  COVID-19   COVID-19 underlying condition?  None   Call Number  Call 1   Call end time  1615   Discharge diagnosis  Severe COVID-19 Pneumonia, POA   Comments  Sees PCP friday   What is the patient's perception of their health status since discharge?  Improving   Does the patient have any of the following symptoms?  -- [fatigue]   Nursing Interventions  Nurse provided patient education   Pulse Ox monitoring  Intermittent   O2 Sat comments  96% on RA   Is the patient/caregiver able to teach back steps to recovery at home?  Rest and rebuild strength, gradually increase activity, Eat a well-balance diet   Is the patient interested in additional calls from an ambulatory ?  NOTE:  applies to high risk patients requiring additional follow-up.  No   Did the patient feel the follow up calls were helpful during their recovery period?  Yes   Was the number of calls appropriate?  Yes   Wrap up additional comments  Pt states has some fatigue but getting better. Will follow up with PCP friday. Was appreaciative for all his care.           Saige Verma RN

## 2021-10-08 ENCOUNTER — HOSPITAL ENCOUNTER (OUTPATIENT)
Dept: GENERAL RADIOLOGY | Facility: HOSPITAL | Age: 51
Discharge: HOME | End: 2021-10-08
Admitting: INTERNAL MEDICINE

## 2021-10-08 ENCOUNTER — OFFICE VISIT (OUTPATIENT)
Dept: INTERNAL MEDICINE | Facility: CLINIC | Age: 51
End: 2021-10-08

## 2021-10-08 VITALS
DIASTOLIC BLOOD PRESSURE: 84 MMHG | TEMPERATURE: 98 F | BODY MASS INDEX: 24.12 KG/M2 | WEIGHT: 182 LBS | SYSTOLIC BLOOD PRESSURE: 140 MMHG | OXYGEN SATURATION: 96 % | HEART RATE: 76 BPM | HEIGHT: 73 IN

## 2021-10-08 DIAGNOSIS — U09.9 LONG COVID: Primary | ICD-10-CM

## 2021-10-08 DIAGNOSIS — U09.9 LONG COVID: ICD-10-CM

## 2021-10-08 PROCEDURE — 71046 X-RAY EXAM CHEST 2 VIEWS: CPT

## 2021-10-08 PROCEDURE — 99213 OFFICE O/P EST LOW 20 MIN: CPT | Performed by: INTERNAL MEDICINE

## 2021-10-08 NOTE — PROGRESS NOTES
"Ramona Torres is a 51 y.o. male    HPI patient coming in for follow-up after recent hospital admission and a prolonged stay with Covid.  Had significant dyspnea at that time and on discharge was home O2 dependent.  Today he is coming in for follow-up he is off oxygen and maintaining a good O2 sat on room air.  However has significant dyspnea on exertion.  He denies chest pain or palpitations no fever or chills.  Has reasonably good oral intake as started doing some push-ups at home.  Appetite has improved    The following portions of the patient's history were reviewed and updated as appropriate: allergies, current medications, past family history, past medical history, past social history, past surgical history, and problem list.     Review of Systems   Constitutional: Negative.  Negative for activity change, appetite change, fatigue and fever.   HENT: Negative for congestion, ear discharge, ear pain and trouble swallowing.    Eyes: Negative for photophobia and visual disturbance.   Respiratory: Positive for shortness of breath. Negative for cough.    Cardiovascular: Negative for chest pain and palpitations.   Gastrointestinal: Negative for abdominal distention, abdominal pain, constipation, diarrhea, nausea and vomiting.   Endocrine: Negative.    Genitourinary: Negative for dysuria, hematuria and urgency.   Musculoskeletal: Positive for arthralgias. Negative for back pain, joint swelling and myalgias.   Skin: Negative for color change and rash.   Allergic/Immunologic: Negative.    Neurological: Negative for dizziness, weakness, light-headedness and headaches.   Hematological: Negative for adenopathy. Does not bruise/bleed easily.   Psychiatric/Behavioral: Negative for agitation, confusion and dysphoric mood. The patient is not nervous/anxious.        Visit Vitals  /84   Pulse 76   Temp 98 °F (36.7 °C) (Infrared)   Ht 185.4 cm (73\")   Wt 82.6 kg (182 lb)   SpO2 96%   BMI 24.01 kg/m² "       Objective  Physical Exam  Constitutional:       General: He is not in acute distress.     Appearance: He is well-developed.   HENT:      Nose: Nose normal.   Eyes:      General: No scleral icterus.     Conjunctiva/sclera: Conjunctivae normal.   Neck:      Thyroid: No thyromegaly.      Trachea: No tracheal deviation.   Cardiovascular:      Rate and Rhythm: Normal rate and regular rhythm.      Heart sounds: No murmur heard.   No friction rub.   Pulmonary:      Effort: No respiratory distress.      Breath sounds: No wheezing or rales.   Abdominal:      General: There is no distension.      Palpations: Abdomen is soft. There is no mass.      Tenderness: There is no abdominal tenderness. There is no guarding.   Musculoskeletal:         General: No deformity. Normal range of motion.   Lymphadenopathy:      Cervical: No cervical adenopathy.   Skin:     General: Skin is warm and dry.      Findings: No erythema or rash.   Neurological:      Mental Status: He is alert and oriented to person, place, and time.      Cranial Nerves: No cranial nerve deficit.      Coordination: Coordination normal.      Deep Tendon Reflexes: Reflexes are normal and symmetric.   Psychiatric:         Behavior: Behavior normal.         Thought Content: Thought content normal.         Judgment: Judgment normal.         Diagnoses and all orders for this visit:    Long COVID lung exam okay O2 sat okay has dyspnea on exertion.  Check chest x-ray.  Check routine lab work.  In view of his significant dyspnea check echocardiogram

## 2021-10-09 LAB
ALBUMIN SERPL-MCNC: 4.4 G/DL (ref 3.5–5.2)
ALBUMIN/GLOB SERPL: 2.4 G/DL
ALP SERPL-CCNC: 61 U/L (ref 39–117)
ALT SERPL-CCNC: 30 U/L (ref 1–41)
AST SERPL-CCNC: 22 U/L (ref 1–40)
BILIRUB SERPL-MCNC: 0.5 MG/DL (ref 0–1.2)
BUN SERPL-MCNC: 11 MG/DL (ref 6–20)
BUN/CREAT SERPL: 14.7 (ref 7–25)
CALCIUM SERPL-MCNC: 9.8 MG/DL (ref 8.6–10.5)
CHLORIDE SERPL-SCNC: 106 MMOL/L (ref 98–107)
CO2 SERPL-SCNC: 25 MMOL/L (ref 22–29)
CREAT SERPL-MCNC: 0.75 MG/DL (ref 0.76–1.27)
ERYTHROCYTE [DISTWIDTH] IN BLOOD BY AUTOMATED COUNT: 12.6 % (ref 12.3–15.4)
GLOBULIN SER CALC-MCNC: 1.8 GM/DL
GLUCOSE SERPL-MCNC: 96 MG/DL (ref 65–99)
HCT VFR BLD AUTO: 43.2 % (ref 37.5–51)
HCV AB S/CO SERPL IA: <0.1 S/CO RATIO (ref 0–0.9)
HGB BLD-MCNC: 14.6 G/DL (ref 13–17.7)
MCH RBC QN AUTO: 31.4 PG (ref 26.6–33)
MCHC RBC AUTO-ENTMCNC: 33.8 G/DL (ref 31.5–35.7)
MCV RBC AUTO: 92.9 FL (ref 79–97)
PLATELET # BLD AUTO: 240 10*3/MM3 (ref 140–450)
POTASSIUM SERPL-SCNC: 4.6 MMOL/L (ref 3.5–5.2)
PROT SERPL-MCNC: 6.2 G/DL (ref 6–8.5)
RBC # BLD AUTO: 4.65 10*6/MM3 (ref 4.14–5.8)
SODIUM SERPL-SCNC: 142 MMOL/L (ref 136–145)
WBC # BLD AUTO: 5.59 10*3/MM3 (ref 3.4–10.8)

## 2021-10-21 ENCOUNTER — HOSPITAL ENCOUNTER (OUTPATIENT)
Dept: CARDIOLOGY | Facility: HOSPITAL | Age: 51
Discharge: HOME | End: 2021-10-21
Admitting: INTERNAL MEDICINE

## 2021-10-21 DIAGNOSIS — U09.9 LONG COVID: ICD-10-CM

## 2021-10-21 PROCEDURE — 93306 TTE W/DOPPLER COMPLETE: CPT

## 2021-10-21 PROCEDURE — 93306 TTE W/DOPPLER COMPLETE: CPT | Performed by: INTERNAL MEDICINE

## 2021-10-22 LAB
BH CV ECHO MEAS - % IVS THICK: 25 %
BH CV ECHO MEAS - % LVPW THICK: 27.3 %
BH CV ECHO MEAS - AO MAX PG (FULL): 2.8 MMHG
BH CV ECHO MEAS - AO MAX PG: 4 MMHG
BH CV ECHO MEAS - AO MEAN PG (FULL): 1 MMHG
BH CV ECHO MEAS - AO MEAN PG: 2 MMHG
BH CV ECHO MEAS - AO ROOT AREA (BSA CORRECTED): 1.5
BH CV ECHO MEAS - AO ROOT AREA: 7.7 CM^2
BH CV ECHO MEAS - AO ROOT DIAM: 3.1 CM
BH CV ECHO MEAS - AO V2 MAX: 106 CM/SEC
BH CV ECHO MEAS - AO V2 MEAN: 68.9 CM/SEC
BH CV ECHO MEAS - AO V2 VTI: 18.3 CM
BH CV ECHO MEAS - AVA(I,A): 3.6 CM^2
BH CV ECHO MEAS - AVA(I,D): 3.6 CM^2
BH CV ECHO MEAS - AVA(V,A): 3 CM^2
BH CV ECHO MEAS - AVA(V,D): 3 CM^2
BH CV ECHO MEAS - BSA(HAYCOCK): 2.1 M^2
BH CV ECHO MEAS - BSA: 2.1 M^2
BH CV ECHO MEAS - BZI_BMI: 24 KILOGRAMS/M^2
BH CV ECHO MEAS - BZI_METRIC_HEIGHT: 185.4 CM
BH CV ECHO MEAS - BZI_METRIC_WEIGHT: 82.6 KG
BH CV ECHO MEAS - EDV(CUBED): 109.2 ML
BH CV ECHO MEAS - EDV(MOD-SP2): 118 ML
BH CV ECHO MEAS - EDV(MOD-SP4): 198 ML
BH CV ECHO MEAS - EDV(TEICH): 106.5 ML
BH CV ECHO MEAS - EF(CUBED): 56.2 %
BH CV ECHO MEAS - EF(MOD-BP): 51 %
BH CV ECHO MEAS - EF(MOD-SP2): 50.8 %
BH CV ECHO MEAS - EF(MOD-SP4): 51.9 %
BH CV ECHO MEAS - EF(TEICH): 47.9 %
BH CV ECHO MEAS - ESV(CUBED): 47.8 ML
BH CV ECHO MEAS - ESV(MOD-SP2): 58.1 ML
BH CV ECHO MEAS - ESV(MOD-SP4): 95.3 ML
BH CV ECHO MEAS - ESV(TEICH): 55.5 ML
BH CV ECHO MEAS - FS: 24.1 %
BH CV ECHO MEAS - IVS/LVPW: 1
BH CV ECHO MEAS - IVSD: 1.3 CM
BH CV ECHO MEAS - IVSS: 1.6 CM
BH CV ECHO MEAS - LA DIMENSION: 3.1 CM
BH CV ECHO MEAS - LA/AO: 0.99
BH CV ECHO MEAS - LAD MAJOR: 6.3 CM
BH CV ECHO MEAS - LAT PEAK E' VEL: 8.5 CM/SEC
BH CV ECHO MEAS - LATERAL E/E' RATIO: 6.2
BH CV ECHO MEAS - LV DIASTOLIC VOL/BSA (35-75): 95.8 ML/M^2
BH CV ECHO MEAS - LV MASS(C)D: 238.7 GRAMS
BH CV ECHO MEAS - LV MASS(C)DI: 115.5 GRAMS/M^2
BH CV ECHO MEAS - LV MASS(C)S: 229.4 GRAMS
BH CV ECHO MEAS - LV MASS(C)SI: 111 GRAMS/M^2
BH CV ECHO MEAS - LV MAX PG: 1.2 MMHG
BH CV ECHO MEAS - LV MEAN PG: 1 MMHG
BH CV ECHO MEAS - LV SYSTOLIC VOL/BSA (12-30): 46.1 ML/M^2
BH CV ECHO MEAS - LV V1 MAX: 55 CM/SEC
BH CV ECHO MEAS - LV V1 MEAN: 37.5 CM/SEC
BH CV ECHO MEAS - LV V1 VTI: 11.5 CM
BH CV ECHO MEAS - LVIDD: 4.8 CM
BH CV ECHO MEAS - LVIDS: 3.6 CM
BH CV ECHO MEAS - LVLD AP2: 9.5 CM
BH CV ECHO MEAS - LVLD AP4: 9.9 CM
BH CV ECHO MEAS - LVLS AP2: 8.1 CM
BH CV ECHO MEAS - LVLS AP4: 8.5 CM
BH CV ECHO MEAS - LVOT AREA (M): 5.7 CM^2
BH CV ECHO MEAS - LVOT AREA: 5.7 CM^2
BH CV ECHO MEAS - LVOT DIAM: 2.7 CM
BH CV ECHO MEAS - LVPWD: 1.3 CM
BH CV ECHO MEAS - LVPWS: 1.6 CM
BH CV ECHO MEAS - MED PEAK E' VEL: 6 CM/SEC
BH CV ECHO MEAS - MEDIAL E/E' RATIO: 8.8
BH CV ECHO MEAS - MV A MAX VEL: 75.3 CM/SEC
BH CV ECHO MEAS - MV DEC TIME: 0.3 SEC
BH CV ECHO MEAS - MV E MAX VEL: 52.6 CM/SEC
BH CV ECHO MEAS - MV E/A: 0.7
BH CV ECHO MEAS - MV MAX PG: 2.7 MMHG
BH CV ECHO MEAS - MV MEAN PG: 1 MMHG
BH CV ECHO MEAS - MV V2 MAX: 81.8 CM/SEC
BH CV ECHO MEAS - MV V2 MEAN: 48.8 CM/SEC
BH CV ECHO MEAS - MV V2 VTI: 18.6 CM
BH CV ECHO MEAS - MVA(VTI): 3.5 CM^2
BH CV ECHO MEAS - PA ACC TIME: 0.16 SEC
BH CV ECHO MEAS - PA MAX PG (FULL): 1.6 MMHG
BH CV ECHO MEAS - PA MAX PG: 4.1 MMHG
BH CV ECHO MEAS - PA MEAN PG (FULL): 1 MMHG
BH CV ECHO MEAS - PA MEAN PG: 2 MMHG
BH CV ECHO MEAS - PA PR(ACCEL): 7.9 MMHG
BH CV ECHO MEAS - PA V2 MAX: 101 CM/SEC
BH CV ECHO MEAS - PA V2 MEAN: 61.7 CM/SEC
BH CV ECHO MEAS - PA V2 VTI: 17.1 CM
BH CV ECHO MEAS - RV MAX PG: 2.5 MMHG
BH CV ECHO MEAS - RV MEAN PG: 1 MMHG
BH CV ECHO MEAS - RV V1 MAX: 78.3 CM/SEC
BH CV ECHO MEAS - RV V1 MEAN: 49 CM/SEC
BH CV ECHO MEAS - RV V1 VTI: 15.2 CM
BH CV ECHO MEAS - SI(AO): 68.5 ML/M^2
BH CV ECHO MEAS - SI(CUBED): 29.7 ML/M^2
BH CV ECHO MEAS - SI(LVOT): 31.9 ML/M^2
BH CV ECHO MEAS - SI(MOD-SP2): 29 ML/M^2
BH CV ECHO MEAS - SI(MOD-SP4): 49.7 ML/M^2
BH CV ECHO MEAS - SI(TEICH): 24.6 ML/M^2
BH CV ECHO MEAS - SV(AO): 141.7 ML
BH CV ECHO MEAS - SV(CUBED): 61.4 ML
BH CV ECHO MEAS - SV(LVOT): 65.8 ML
BH CV ECHO MEAS - SV(MOD-SP2): 59.9 ML
BH CV ECHO MEAS - SV(MOD-SP4): 102.7 ML
BH CV ECHO MEAS - SV(TEICH): 51 ML
BH CV ECHO MEAS - TAPSE (>1.6): 3.3 CM
BH CV ECHO MEAS - TR MAX PG: 22 MMHG
BH CV ECHO MEAS - TR MAX VEL: 233 CM/SEC
BH CV ECHO MEASUREMENTS AVERAGE E/E' RATIO: 7.26
BH CV XLRA - RV BASE: 4.9 CM
BH CV XLRA - RV LENGTH: 8.2 CM
BH CV XLRA - RV MID: 4.2 CM
BH CV XLRA - TDI S': 17 CM/SEC
MAXIMAL PREDICTED HEART RATE: 170 BPM
STRESS TARGET HR: 145 BPM

## 2021-11-06 ENCOUNTER — OFFICE VISIT (OUTPATIENT)
Dept: INTERNAL MEDICINE | Facility: CLINIC | Age: 51
End: 2021-11-06

## 2021-11-06 VITALS
DIASTOLIC BLOOD PRESSURE: 70 MMHG | WEIGHT: 196.8 LBS | BODY MASS INDEX: 26.08 KG/M2 | SYSTOLIC BLOOD PRESSURE: 122 MMHG | TEMPERATURE: 98.2 F | HEIGHT: 73 IN | OXYGEN SATURATION: 99 % | HEART RATE: 87 BPM

## 2021-11-06 DIAGNOSIS — M95.8 WINGED SCAPULA OF LEFT SIDE: ICD-10-CM

## 2021-11-06 DIAGNOSIS — U09.9 LONG COVID: Primary | ICD-10-CM

## 2021-11-06 PROCEDURE — 99213 OFFICE O/P EST LOW 20 MIN: CPT | Performed by: INTERNAL MEDICINE

## 2021-11-06 NOTE — PROGRESS NOTES
"Ramona Torres is a 51 y.o. male    HPI coming in for a follow-up patient with Covid infection with complication with severe respiratory symptoms requiring hospitalization for about 10 days has had gradual improvement initially needed oxygen supplement now off oxygen.  Yesterday managed to do a 4 mile run albeit at a slower pace.  Has had weight gain has had improvement with his energy level and appetite.  Denies fever or chills or myalgias    The following portions of the patient's history were reviewed and updated as appropriate: allergies, current medications, past family history, past medical history, past social history, past surgical history, and problem list.     Review of Systems   Constitutional: Positive for fatigue. Negative for activity change, appetite change and fever.   HENT: Negative for congestion, ear discharge, ear pain and trouble swallowing.    Eyes: Negative for photophobia and visual disturbance.   Respiratory: Positive for shortness of breath. Negative for cough.    Cardiovascular: Negative for chest pain and palpitations.   Gastrointestinal: Negative for abdominal distention, abdominal pain, constipation, diarrhea, nausea and vomiting.   Endocrine: Negative.    Genitourinary: Negative for dysuria, hematuria and urgency.   Musculoskeletal: Negative for arthralgias, back pain, joint swelling and myalgias.   Skin: Negative for color change and rash.   Allergic/Immunologic: Negative.    Neurological: Negative for dizziness, weakness, light-headedness and headaches.   Hematological: Negative for adenopathy. Does not bruise/bleed easily.   Psychiatric/Behavioral: Negative for agitation, confusion and dysphoric mood. The patient is not nervous/anxious.        Visit Vitals  /70   Pulse 87   Temp 98.2 °F (36.8 °C) (Infrared)   Ht 185.4 cm (73\")   Wt 89.3 kg (196 lb 12.8 oz)   SpO2 99%   BMI 25.96 kg/m²       Objective  Physical Exam  Constitutional:       General: He is not in " acute distress.     Appearance: He is well-developed.   HENT:      Nose: Nose normal.   Eyes:      General: No scleral icterus.     Conjunctiva/sclera: Conjunctivae normal.   Neck:      Thyroid: No thyromegaly.      Trachea: No tracheal deviation.   Cardiovascular:      Rate and Rhythm: Normal rate and regular rhythm.      Heart sounds: No murmur heard.  No friction rub.   Pulmonary:      Effort: No respiratory distress.      Breath sounds: No wheezing or rales.   Abdominal:      General: There is no distension.      Palpations: Abdomen is soft. There is no mass.      Tenderness: There is no abdominal tenderness. There is no guarding.   Musculoskeletal:         General: No deformity. Normal range of motion.      Comments: Lt winged scapula   Lymphadenopathy:      Cervical: No cervical adenopathy.   Skin:     General: Skin is warm and dry.      Findings: No erythema or rash.   Neurological:      Mental Status: He is alert and oriented to person, place, and time.      Cranial Nerves: No cranial nerve deficit.      Coordination: Coordination normal.      Deep Tendon Reflexes: Reflexes are normal and symmetric.   Psychiatric:         Behavior: Behavior normal.         Thought Content: Thought content normal.         Judgment: Judgment normal.         Diagnoses and all orders for this visit:    Long COVID significant improvement may resume regular work at the [employer] in a week.  Discussed timing for Covid vaccination    Winged scapula of left side has had this for at least 6 months exercises regularly denies significant pain.  Ortho referral and possible physical therapy after that  -     Ambulatory Referral to Orthopedic Surgery

## 2022-07-11 ENCOUNTER — OFFICE VISIT (OUTPATIENT)
Dept: INTERNAL MEDICINE | Facility: CLINIC | Age: 52
End: 2022-07-11

## 2022-07-11 VITALS
BODY MASS INDEX: 26.64 KG/M2 | HEIGHT: 73 IN | SYSTOLIC BLOOD PRESSURE: 122 MMHG | TEMPERATURE: 98.7 F | OXYGEN SATURATION: 98 % | HEART RATE: 60 BPM | WEIGHT: 201 LBS | DIASTOLIC BLOOD PRESSURE: 78 MMHG

## 2022-07-11 DIAGNOSIS — Z12.11 SCREEN FOR COLON CANCER: ICD-10-CM

## 2022-07-11 DIAGNOSIS — L03.011 PARONYCHIA OF RIGHT THUMB: Primary | ICD-10-CM

## 2022-07-11 PROCEDURE — 10060 I&D ABSCESS SIMPLE/SINGLE: CPT | Performed by: INTERNAL MEDICINE

## 2022-07-11 PROCEDURE — 99213 OFFICE O/P EST LOW 20 MIN: CPT | Performed by: INTERNAL MEDICINE

## 2022-07-11 RX ORDER — DOXYCYCLINE HYCLATE 100 MG/1
100 CAPSULE ORAL 2 TIMES DAILY
Qty: 10 CAPSULE | Refills: 0 | Status: SHIPPED | OUTPATIENT
Start: 2022-07-11 | End: 2022-11-18

## 2022-07-11 NOTE — PROGRESS NOTES
"Ramona Torres is a 52 y.o. male    HPI 3-day history of right thumb pain with some redness and swelling.  Denies direct trauma.  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  No fever or chills has not taken any medication for this.  Had tried soaking this in Epson salts    The following portions of the patient's history were reviewed and updated as appropriate: allergies, current medications, past family history, past medical history, past social history, past surgical history, and problem list.     Review of Systems   Constitutional: Negative.  Negative for activity change, appetite change, fatigue and fever.   HENT: Negative for congestion, ear discharge, ear pain and trouble swallowing.    Eyes: Negative for photophobia and visual disturbance.   Respiratory: Negative for cough and shortness of breath.    Cardiovascular: Negative for chest pain and palpitations.   Gastrointestinal: Negative for abdominal distention, abdominal pain, constipation, diarrhea, nausea and vomiting.   Endocrine: Negative.    Genitourinary: Negative for dysuria, hematuria and urgency.   Musculoskeletal: Negative for arthralgias, back pain, joint swelling and myalgias.   Skin: Negative for color change and rash.   Allergic/Immunologic: Negative.    Neurological: Negative for dizziness, weakness, light-headedness and headaches.   Hematological: Negative for adenopathy. Does not bruise/bleed easily.   Psychiatric/Behavioral: Negative for agitation, confusion and dysphoric mood. The patient is not nervous/anxious.        Visit Vitals  /78   Pulse 60   Temp 98.7 °F (37.1 °C) (Infrared)   Ht 185.4 cm (73\")   Wt 91.2 kg (201 lb)   SpO2 98%   BMI 26.52 kg/m²       Objective  Physical Exam  Constitutional:       General: He is not in acute distress.     Appearance: He is well-developed.   HENT:      Nose: Nose normal.   Eyes:      General: No scleral icterus.     Conjunctiva/sclera: Conjunctivae normal.   Neck:      Thyroid: No " thyromegaly.      Trachea: No tracheal deviation.   Cardiovascular:      Rate and Rhythm: Normal rate and regular rhythm.      Heart sounds: No murmur heard.    No friction rub.   Pulmonary:      Effort: No respiratory distress.      Breath sounds: No wheezing or rales.   Abdominal:      General: There is no distension.      Palpations: Abdomen is soft. There is no mass.      Tenderness: There is no abdominal tenderness. There is no guarding.   Musculoskeletal:         General: No deformity. Normal range of motion.   Lymphadenopathy:      Cervical: No cervical adenopathy.   Skin:     General: Skin is warm and dry.      Findings: Erythema present. No rash.   Neurological:      Mental Status: He is alert and oriented to person, place, and time.      Cranial Nerves: No cranial nerve deficit.      Coordination: Coordination normal.      Deep Tendon Reflexes: Reflexes are normal and symmetric.   Psychiatric:         Behavior: Behavior normal.         Thought Content: Thought content normal.         Judgment: Judgment normal.         Diagnoses and all orders for this visit:    Paronychia of right thumb procedure area cleaned with alcohol after he had washed his hands thoroughly with soap and water.  Using an 18-gauge needle area punctured thick purulent drainage obtained this is being sent for gram stain and culture.  Empiric antibiotic therapy with doxycycline local wound care discussed

## 2022-07-18 LAB
BACTERIA SPEC CULT: NORMAL
BACTERIA SPEC CULT: NORMAL
MICROORGANISM/AGENT SPEC: NORMAL
MICROORGANISM/AGENT SPEC: NORMAL

## 2022-11-18 ENCOUNTER — OFFICE VISIT (OUTPATIENT)
Dept: INTERNAL MEDICINE | Facility: CLINIC | Age: 52
End: 2022-11-18

## 2022-11-18 VITALS
HEART RATE: 52 BPM | HEIGHT: 73 IN | WEIGHT: 195 LBS | OXYGEN SATURATION: 98 % | DIASTOLIC BLOOD PRESSURE: 80 MMHG | BODY MASS INDEX: 25.84 KG/M2 | SYSTOLIC BLOOD PRESSURE: 128 MMHG | TEMPERATURE: 98 F

## 2022-11-18 DIAGNOSIS — R60.0 SWELLING OF RIGHT PAROTID GLAND: Primary | ICD-10-CM

## 2022-11-18 PROCEDURE — 99213 OFFICE O/P EST LOW 20 MIN: CPT | Performed by: INTERNAL MEDICINE

## 2022-11-18 RX ORDER — DOXYCYCLINE HYCLATE 100 MG/1
100 CAPSULE ORAL 2 TIMES DAILY
Qty: 14 CAPSULE | Refills: 0 | Status: SHIPPED | OUTPATIENT
Start: 2022-11-18

## 2022-11-18 NOTE — PROGRESS NOTES
"Ramona Torres is a 52 y.o. male    HPI 2-day history of right-sided facial swelling with minimal discomfort no associated fever or chills denies any oral pain no new trauma has not had similar complaints in the past    The following portions of the patient's history were reviewed and updated as appropriate: allergies, current medications, past family history, past medical history, past social history, past surgical history, and problem list.     Review of Systems   Constitutional: Negative.  Negative for activity change, appetite change, fatigue and fever.   HENT: Positive for facial swelling. Negative for congestion, ear discharge, ear pain and trouble swallowing.    Eyes: Negative for photophobia and visual disturbance.   Respiratory: Negative for cough and shortness of breath.    Cardiovascular: Negative for chest pain and palpitations.   Gastrointestinal: Negative for abdominal distention, abdominal pain, constipation, diarrhea, nausea and vomiting.   Endocrine: Negative.    Genitourinary: Negative for dysuria, hematuria and urgency.   Musculoskeletal: Positive for arthralgias. Negative for back pain, joint swelling and myalgias.   Skin: Negative for color change and rash.   Allergic/Immunologic: Negative.    Neurological: Negative for dizziness, weakness, light-headedness and headaches.   Hematological: Negative for adenopathy. Does not bruise/bleed easily.   Psychiatric/Behavioral: Negative for agitation, confusion and dysphoric mood. The patient is not nervous/anxious.        Visit Vitals  /80   Pulse 52   Temp 98 °F (36.7 °C) (Infrared)   Ht 185.4 cm (73\")   Wt 88.5 kg (195 lb)   SpO2 98%   BMI 25.73 kg/m²       Objective  Physical Exam  Constitutional:       General: He is not in acute distress.     Appearance: He is well-developed.   HENT:      Head:        Comments: swelling     Nose: Nose normal.   Eyes:      General: No scleral icterus.     Conjunctiva/sclera: Conjunctivae normal. "   Neck:      Thyroid: No thyromegaly.      Trachea: No tracheal deviation.   Cardiovascular:      Rate and Rhythm: Normal rate and regular rhythm.      Heart sounds: No murmur heard.    No friction rub.   Pulmonary:      Effort: No respiratory distress.      Breath sounds: No wheezing or rales.   Abdominal:      General: There is no distension.      Palpations: Abdomen is soft. There is no mass.      Tenderness: There is no abdominal tenderness. There is no guarding.   Musculoskeletal:         General: No deformity. Normal range of motion.   Lymphadenopathy:      Cervical: No cervical adenopathy.   Skin:     General: Skin is warm and dry.      Findings: No erythema or rash.   Neurological:      Mental Status: He is alert and oriented to person, place, and time.      Cranial Nerves: No cranial nerve deficit.      Coordination: Coordination normal.      Deep Tendon Reflexes: Reflexes are normal and symmetric.   Psychiatric:         Behavior: Behavior normal.         Thought Content: Thought content normal.         Judgment: Judgment normal.         Diagnoses and all orders for this visit:    Swelling of right parotid gland suspect sialadenitis.  Advised warm compresses.  Sugar-free sour candy.  If he should develop fever or more discomfort advised doxycycline which has been prescribed for him or visit to the emergency room.

## 2023-11-16 ENCOUNTER — OFFICE VISIT (OUTPATIENT)
Dept: INTERNAL MEDICINE | Facility: CLINIC | Age: 53
End: 2023-11-16
Payer: COMMERCIAL

## 2023-11-16 VITALS
SYSTOLIC BLOOD PRESSURE: 138 MMHG | HEART RATE: 56 BPM | WEIGHT: 192.8 LBS | HEIGHT: 73 IN | TEMPERATURE: 98 F | BODY MASS INDEX: 25.55 KG/M2 | DIASTOLIC BLOOD PRESSURE: 83 MMHG | OXYGEN SATURATION: 96 %

## 2023-11-16 DIAGNOSIS — E29.1 HYPOGONADISM MALE: ICD-10-CM

## 2023-11-16 DIAGNOSIS — Z00.00 ROUTINE GENERAL MEDICAL EXAMINATION AT A HEALTH CARE FACILITY: ICD-10-CM

## 2023-11-16 DIAGNOSIS — R53.83 OTHER FATIGUE: Primary | ICD-10-CM

## 2023-11-16 DIAGNOSIS — G47.33 OBSTRUCTIVE SLEEP APNEA SYNDROME: ICD-10-CM

## 2023-11-16 RX ORDER — TRAZODONE HYDROCHLORIDE 50 MG/1
50 TABLET ORAL NIGHTLY
Qty: 30 TABLET | Refills: 5 | Status: SHIPPED | OUTPATIENT
Start: 2023-11-16

## 2023-11-16 NOTE — PROGRESS NOTES
Chief Complaint   Patient presents with    Annual Exam    Bleeding/Bruising    Fatigue    Insomnia       Alberto Torres is a 53 y.o. male and is here for a comprehensive physical exam. The patient reports problems - fatigue .     History:  Erectile dysfunction  no  Nocturia  no      Do you take any herbs or supplements that were not prescribed by a doctor? no    Are you taking aspirin daily? no      Health Habits:  Dental Exam. up to date  Eye Exam. up to date  Exercise: 4 times/week.  Current exercise activities include: cardiovascular workout on exercise equipment and weightlifting    Health Maintenance   Topic Date Due    BMI FOLLOWUP  Never done    COLORECTAL CANCER SCREENING  Never done    ANNUAL PHYSICAL  Never done    ZOSTER VACCINE (1 of 2) Never done    TDAP/TD VACCINES (2 - Td or Tdap) 12/13/2022    INFLUENZA VACCINE  Never done    COVID-19 Vaccine (2 - 2023-24 season) 09/01/2023    HEPATITIS C SCREENING  Completed    Pneumococcal Vaccine 0-64  Aged Out       PMH, PSH, SocHx, FamHx, Allergies, and Medications: Reviewed and updated in the Visit Navigator.     No Known Allergies  Past Medical History:   Diagnosis Date    COVID-19 2023    Impaired functional mobility, balance, gait, and endurance      History reviewed. No pertinent surgical history.  Social History     Socioeconomic History    Marital status:    Tobacco Use    Smoking status: Never    Smokeless tobacco: Never   Substance and Sexual Activity    Alcohol use: Yes     Alcohol/week: 4.0 standard drinks of alcohol     Types: 4 Drinks containing 0.5 oz of alcohol per week     Comment: OCCASIONALLY    Drug use: Never    Sexual activity: Defer     History reviewed. No pertinent family history.    Review of Systems  Review of Systems   Constitutional: Negative.  Negative for activity change, appetite change, fatigue and fever.   HENT:  Negative for congestion, ear discharge, ear pain and trouble swallowing.    Eyes:  Negative for photophobia  "and visual disturbance.   Respiratory:  Negative for cough and shortness of breath.    Cardiovascular:  Negative for chest pain and palpitations.   Gastrointestinal:  Negative for abdominal distention, abdominal pain, constipation, diarrhea, nausea and vomiting.   Endocrine: Negative.    Genitourinary:  Negative for dysuria, hematuria and urgency.   Musculoskeletal:  Positive for arthralgias. Negative for back pain, joint swelling and myalgias.   Skin:  Negative for color change and rash.   Allergic/Immunologic: Negative.    Neurological:  Negative for dizziness, weakness, light-headedness and headaches.   Hematological:  Negative for adenopathy. Does not bruise/bleed easily.   Psychiatric/Behavioral:  Negative for agitation, confusion and dysphoric mood. The patient is not nervous/anxious.        Vitals:    11/16/23 1536   BP: 138/83   Pulse: 56   Temp: 98 °F (36.7 °C)   SpO2: 96%       Objective   /83   Pulse 56   Temp 98 °F (36.7 °C)   Ht 185.4 cm (72.99\")   Wt 87.5 kg (192 lb 12.8 oz)   SpO2 96%   BMI 25.44 kg/m²     Physical Exam  Constitutional:       General: He is not in acute distress.     Appearance: He is well-developed.   HENT:      Nose: Nose normal.   Eyes:      General: No scleral icterus.     Conjunctiva/sclera: Conjunctivae normal.   Neck:      Thyroid: No thyromegaly.      Trachea: No tracheal deviation.   Cardiovascular:      Rate and Rhythm: Normal rate and regular rhythm.      Heart sounds: No murmur heard.     No friction rub.   Pulmonary:      Effort: No respiratory distress.      Breath sounds: No wheezing or rales.   Abdominal:      General: There is no distension.      Palpations: Abdomen is soft. There is no mass.      Tenderness: There is no abdominal tenderness. There is no guarding.   Musculoskeletal:         General: Deformity present. Normal range of motion.   Lymphadenopathy:      Cervical: No cervical adenopathy.   Skin:     General: Skin is warm and dry.      Findings: " "No erythema or rash.   Neurological:      Mental Status: He is alert and oriented to person, place, and time.      Cranial Nerves: No cranial nerve deficit.      Coordination: Coordination normal.      Deep Tendon Reflexes: Reflexes are normal and symmetric.   Psychiatric:         Behavior: Behavior normal.         Thought Content: Thought content normal.         Judgment: Judgment normal.       The CVD Risk score (Ryan et al., 2008) failed to calculate for the following reasons:    Cannot find a previous HDL lab    Cannot find a previous total cholesterol lab    No results found for: \"CHOL\", \"CHLPL\", \"TRIG\", \"HDL\", \"LDL\", \"LDLDIRECT\"  Glucose   Date Value Ref Range Status   10/08/2021 96 65 - 99 mg/dL Final   09/09/2021 99 65 - 99 mg/dL Final     BUN   Date Value Ref Range Status   10/08/2021 11 6 - 20 mg/dL Final   09/09/2021 19 6 - 20 mg/dL Final     Creatinine   Date Value Ref Range Status   10/08/2021 0.75 (L) 0.76 - 1.27 mg/dL Final   09/09/2021 0.73 (L) 0.76 - 1.27 mg/dL Final     Sodium   Date Value Ref Range Status   10/08/2021 142 136 - 145 mmol/L Final   09/09/2021 136 136 - 145 mmol/L Final     Potassium   Date Value Ref Range Status   10/08/2021 4.6 3.5 - 5.2 mmol/L Final   09/09/2021 3.7 3.5 - 5.2 mmol/L Final     Chloride   Date Value Ref Range Status   10/08/2021 106 98 - 107 mmol/L Final   09/09/2021 100 98 - 107 mmol/L Final     CO2   Date Value Ref Range Status   09/09/2021 27.5 22.0 - 29.0 mmol/L Final     Total CO2   Date Value Ref Range Status   10/08/2021 25.0 22.0 - 29.0 mmol/L Final     Calcium   Date Value Ref Range Status   10/08/2021 9.8 8.6 - 10.5 mg/dL Final   09/09/2021 9.1 8.6 - 10.5 mg/dL Final     Total Protein   Date Value Ref Range Status   08/31/2021 6.8 6.0 - 8.5 g/dL Final     Albumin   Date Value Ref Range Status   10/08/2021 4.40 3.50 - 5.20 g/dL Final   09/09/2021 2.90 (L) 3.50 - 5.20 g/dL Final     ALT (SGPT)   Date Value Ref Range Status   10/08/2021 30 1 - 41 U/L " Final   08/31/2021 54 (H) 1 - 41 U/L Final     AST (SGOT)   Date Value Ref Range Status   10/08/2021 22 1 - 40 U/L Final   08/31/2021 100 (H) 1 - 40 U/L Final     Alkaline Phosphatase   Date Value Ref Range Status   10/08/2021 61 39 - 117 U/L Final   08/31/2021 72 39 - 117 U/L Final     Total Bilirubin   Date Value Ref Range Status   10/08/2021 0.5 0.0 - 1.2 mg/dL Final   08/31/2021 0.5 0.0 - 1.2 mg/dL Final     eGFR Non  Am   Date Value Ref Range Status   10/08/2021 110 >60 mL/min/1.73 Final     Comment:     GFR Normal >60  Chronic Kidney Disease <60  Kidney Failure <15       eGFR Non  Amer   Date Value Ref Range Status   09/09/2021 114 >60 mL/min/1.73 Final     A/G Ratio   Date Value Ref Range Status   10/08/2021 2.4 g/dL Final     BUN/Creatinine Ratio   Date Value Ref Range Status   10/08/2021 14.7 7.0 - 25.0 Final   09/09/2021 26.0 (H) 7.0 - 25.0 Final     Anion Gap   Date Value Ref Range Status   09/09/2021 8.5 5.0 - 15.0 mmol/L Final     Lab Results   Component Value Date    WBC 5.59 10/08/2021    HGB 14.6 10/08/2021    HCT 43.2 10/08/2021    MCV 92.9 10/08/2021     10/08/2021       Assessment & Plan   1. Healthy male exam.   2. Patient Counseling: Including but not Limited to the following, when appropriate:  --Nutrition: Stressed importance of moderation in sodium/caffeine intake, saturated fat and cholesterol, caloric balance, sufficient intake of fresh fruits, vegetables, fiber    --Exercise: Stressed the importance of regular exercise.   --Substance Abuse: Discussed cessation/primary prevention of tobacco, alcohol, or other drug use; driving or other dangerous activities under the influence; availability of treatment for abuse, as indicated based on social history.    --Sexuality: Discussed sexually transmitted diseases, partner selection, use of condoms and contraceptive alternatives.   --Injury prevention: Discussed safety belts, safety helmets, smoke detector, smoking near bedding  or upholstery.   --Dental health: Discussed importance of regular tooth brushing, flossing, and dental visits.  --Immunizations reviewed.  --Discussed benefits of colon cancer screening.      3. Discussed the patient's BMI with him. BMI is >= 25 and <30. (Overweight) The following options were offered after discussion;: exercise counseling/recommendations and nutrition counseling/recommendations  . The BMI is above average; BMI management plan is completed  4. No follow-ups on file.  5. Age-appropriate Screening Scheduled  6. There are no Patient Instructions on file for this visit.    Assessment & Plan     Diagnoses and all orders for this visit:    1. Other fatigue (Primary)    2. Routine general medical examination at a health care facility

## 2023-11-17 LAB
ALBUMIN SERPL-MCNC: 4.8 G/DL (ref 3.8–4.9)
ALBUMIN/GLOB SERPL: 2.4 {RATIO} (ref 1.2–2.2)
ALP SERPL-CCNC: 46 IU/L (ref 44–121)
ALT SERPL-CCNC: 21 IU/L (ref 0–44)
AST SERPL-CCNC: 28 IU/L (ref 0–40)
BASOPHILS # BLD AUTO: 0.1 X10E3/UL (ref 0–0.2)
BASOPHILS NFR BLD AUTO: 1 %
BILIRUB SERPL-MCNC: 0.6 MG/DL (ref 0–1.2)
BUN SERPL-MCNC: 14 MG/DL (ref 6–24)
BUN/CREAT SERPL: 13 (ref 9–20)
CALCIUM SERPL-MCNC: 9.7 MG/DL (ref 8.7–10.2)
CHLORIDE SERPL-SCNC: 105 MMOL/L (ref 96–106)
CO2 SERPL-SCNC: 24 MMOL/L (ref 20–29)
CREAT SERPL-MCNC: 1.11 MG/DL (ref 0.76–1.27)
EGFRCR SERPLBLD CKD-EPI 2021: 80 ML/MIN/1.73
EOSINOPHIL # BLD AUTO: 0.4 X10E3/UL (ref 0–0.4)
EOSINOPHIL NFR BLD AUTO: 6 %
ERYTHROCYTE [DISTWIDTH] IN BLOOD BY AUTOMATED COUNT: 12.1 % (ref 11.6–15.4)
GLOBULIN SER CALC-MCNC: 2 G/DL (ref 1.5–4.5)
GLUCOSE SERPL-MCNC: 92 MG/DL (ref 70–99)
HBA1C MFR BLD: 5.4 % (ref 4.8–5.6)
HCT VFR BLD AUTO: 44 % (ref 37.5–51)
HGB BLD-MCNC: 15.1 G/DL (ref 13–17.7)
IMM GRANULOCYTES # BLD AUTO: 0 X10E3/UL (ref 0–0.1)
IMM GRANULOCYTES NFR BLD AUTO: 0 %
LYMPHOCYTES # BLD AUTO: 2.4 X10E3/UL (ref 0.7–3.1)
LYMPHOCYTES NFR BLD AUTO: 35 %
MCH RBC QN AUTO: 32.3 PG (ref 26.6–33)
MCHC RBC AUTO-ENTMCNC: 34.3 G/DL (ref 31.5–35.7)
MCV RBC AUTO: 94 FL (ref 79–97)
MONOCYTES # BLD AUTO: 0.5 X10E3/UL (ref 0.1–0.9)
MONOCYTES NFR BLD AUTO: 7 %
NEUTROPHILS # BLD AUTO: 3.4 X10E3/UL (ref 1.4–7)
NEUTROPHILS NFR BLD AUTO: 51 %
PLATELET # BLD AUTO: 249 X10E3/UL (ref 150–450)
POTASSIUM SERPL-SCNC: 4.4 MMOL/L (ref 3.5–5.2)
PROT SERPL-MCNC: 6.8 G/DL (ref 6–8.5)
RBC # BLD AUTO: 4.68 X10E6/UL (ref 4.14–5.8)
SODIUM SERPL-SCNC: 145 MMOL/L (ref 134–144)
TESTOST SERPL-MCNC: 795 NG/DL (ref 264–916)
WBC # BLD AUTO: 6.7 X10E3/UL (ref 3.4–10.8)

## 2024-01-11 ENCOUNTER — OFFICE VISIT (OUTPATIENT)
Dept: INTERNAL MEDICINE | Facility: CLINIC | Age: 54
End: 2024-01-11
Payer: COMMERCIAL

## 2024-01-11 VITALS
HEART RATE: 52 BPM | DIASTOLIC BLOOD PRESSURE: 88 MMHG | RESPIRATION RATE: 18 BRPM | SYSTOLIC BLOOD PRESSURE: 147 MMHG | OXYGEN SATURATION: 96 % | HEIGHT: 73 IN | TEMPERATURE: 97.5 F | WEIGHT: 200 LBS | BODY MASS INDEX: 26.51 KG/M2

## 2024-01-11 DIAGNOSIS — R68.89 FLU-LIKE SYMPTOMS: Primary | ICD-10-CM

## 2024-01-11 DIAGNOSIS — R03.0 ELEVATED BLOOD PRESSURE READING: ICD-10-CM

## 2024-01-11 DIAGNOSIS — J06.9 ACUTE URI: ICD-10-CM

## 2024-01-11 LAB
EXPIRATION DATE: NORMAL
FLUAV AG UPPER RESP QL IA.RAPID: NOT DETECTED
FLUBV AG UPPER RESP QL IA.RAPID: NOT DETECTED
INTERNAL CONTROL: NORMAL
Lab: NORMAL
SARS-COV-2 AG UPPER RESP QL IA.RAPID: NOT DETECTED

## 2024-01-11 PROCEDURE — 87428 SARSCOV & INF VIR A&B AG IA: CPT | Performed by: NURSE PRACTITIONER

## 2024-01-11 PROCEDURE — 99213 OFFICE O/P EST LOW 20 MIN: CPT | Performed by: NURSE PRACTITIONER

## 2024-01-11 RX ORDER — LORATADINE 10 MG/1
10 TABLET ORAL DAILY
Qty: 30 TABLET | Refills: 1 | Status: SHIPPED | OUTPATIENT
Start: 2024-01-11

## 2024-01-11 RX ORDER — AMOXICILLIN AND CLAVULANATE POTASSIUM 875; 125 MG/1; MG/1
1 TABLET, FILM COATED ORAL 2 TIMES DAILY
Qty: 20 TABLET | Refills: 0 | Status: SHIPPED | OUTPATIENT
Start: 2024-01-11

## 2024-01-11 NOTE — PROGRESS NOTES
Chief Complaint / Reason:      Chief Complaint   Patient presents with    URI     Head congestion x 2 days       Subjective     HPI    The patient is a 53-year-old male who presents today with complaints of upper respiratory infection and head congestion that has been going on for several days. Blood pressure is elevated at 147/88.    He is coughing up a minor amount of clear phlegm. He denies any fever or chills. He normally sees Dr. Jimenes. He has not taken anything for his symptoms. He was working in his car the other night and woke up yesterday not feeling well. Last night, he had post nasal drainage. He had COVID-19 infection and had a lot of scar tissue in his lungs. He lost 30 pounds during this time and was in the hospital for a couple of weeks and was off work for 3 months. He has since gained his strength back. He runs 3 to 4 miles a day. He does not drink a lot of pop or caffeine. He did drink an energy drink this morning. His heart rate is typically low because he exercises regularly. He denies any chest pain. He wakes up at night with shortness of breath. Dr. Jimenes was supposed to send him for a sleep study, but he did not go. He stays well hydrated and drinks a lot of orange juice and eats apples for snacks. He came in for his checkup a few weeks ago and was told that his lungs sounded clear.    He does not sleep well. He was given trazodone. He can go to sleep for a couple of hours, but he can not stay asleep.     He has osteopenia. He had blood work done, but results returned as normal.     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   He does not smoke.    His father passed away of a massive heart attack at 52 years old.    History taken from: patient    PMH/FH/Social History were reviewed and updated appropriately in the electronic medical record.   Past Medical History:   Diagnosis Date    COVID-19 2023    Impaired functional mobility, balance, gait, and endurance      History reviewed. No  pertinent surgical history.  Social History     Socioeconomic History    Marital status:    Tobacco Use    Smoking status: Never    Smokeless tobacco: Never   Substance and Sexual Activity    Alcohol use: Yes     Alcohol/week: 4.0 standard drinks of alcohol     Types: 4 Drinks containing 0.5 oz of alcohol per week     Comment: OCCASIONALLY    Drug use: Never    Sexual activity: Defer     History reviewed. No pertinent family history.    Review of Systems:   Review of Systems      All other systems were reviewed and are negative.  Exceptions are noted in the subjective or above.      Objective     Vital Signs  Vitals:    01/11/24 1011   BP: 147/88   Pulse: 52   Resp: 18   Temp: 97.5 °F (36.4 °C)   SpO2: 96%       Body mass index is 26.39 kg/m².  BMI is >= 25 and <30. (Overweight) The following options were offered after discussion;: exercise counseling/recommendations and nutrition counseling/recommendations       Physical Exam  Vitals and nursing note reviewed.   Constitutional:       General: He is not in acute distress.     Appearance: He is well-developed.   HENT:      Head: Normocephalic and atraumatic.      Right Ear: Ear canal and external ear normal. Tympanic membrane is erythematous and bulging.      Left Ear: Ear canal and external ear normal. Tympanic membrane is erythematous and bulging.      Nose: Mucosal edema present. No rhinorrhea.      Right Sinus: No maxillary sinus tenderness or frontal sinus tenderness.      Left Sinus: No maxillary sinus tenderness or frontal sinus tenderness.      Mouth/Throat:      Mouth: Mucous membranes are dry.      Pharynx: Posterior oropharyngeal erythema present.      Comments: PND    Eyes:      Conjunctiva/sclera: Conjunctivae normal.   Cardiovascular:      Rate and Rhythm: Regular rhythm. Bradycardia present.      Heart sounds: Normal heart sounds.   Pulmonary:      Effort: Pulmonary effort is normal. No respiratory distress.      Breath sounds: Normal breath  sounds.   Lymphadenopathy:      Head:      Right side of head: No submental, submandibular or tonsillar adenopathy.      Left side of head: No submental, submandibular or tonsillar adenopathy.      Cervical: No cervical adenopathy.   Skin:     General: Skin is warm and dry.      Capillary Refill: Capillary refill takes less than 2 seconds.      Findings: No rash.   Neurological:      Mental Status: He is alert and oriented to person, place, and time.   Psychiatric:         Behavior: Behavior normal.         Thought Content: Thought content normal.         Judgment: Judgment normal.              Results Review:    I reviewed the patient's new clinical results.   Office Visit on 01/11/2024   Component Date Value Ref Range Status    SARS Antigen 01/11/2024 Not Detected  Not Detected, Presumptive Negative Final    Influenza A Antigen JAQUELINE 01/11/2024 Not Detected  Not Detected Final    Influenza B Antigen JAQUELINE 01/11/2024 Not Detected  Not Detected Final    Internal Control 01/11/2024 Passed  Passed Final    Lot Number 01/11/2024 [id number]   Final    Expiration Date 01/11/2024 11/10/24   Final         Medication Review:     Current Outpatient Medications:     traZODone (DESYREL) 50 MG tablet, Take 1 tablet by mouth Every Night., Disp: 30 tablet, Rfl: 5    amoxicillin-clavulanate (AUGMENTIN) 875-125 MG per tablet, Take 1 tablet by mouth 2 (Two) Times a Day., Disp: 20 tablet, Rfl: 0    loratadine (Claritin) 10 MG tablet, Take 1 tablet by mouth Daily., Disp: 30 tablet, Rfl: 1    Diagnoses and all orders for this visit:    Flu-like symptoms  -     POCT SARS-CoV-2 + Flu Antigen JAQUELINE    Acute URI  -     amoxicillin-clavulanate (AUGMENTIN) 875-125 MG per tablet; Take 1 tablet by mouth 2 (Two) Times a Day.  -     loratadine (Claritin) 10 MG tablet; Take 1 tablet by mouth Daily.    Elevated blood pressure reading    1. Upper respiratory infection and head congestion.  His oxygen saturation is slightly low.   His blood pressure is  elevated.   He will monitor his blood pressure at home.   He will avoid energy drinks but was encouraged to stay hydrated with water.   He will avoid NSAIDs.  Prescription sent to pharmacy for Augmentin.   He will take over the counter Zyrtec as needed.    2. Suspected sleep apnea.  He will follow up with Dr. Jimenes and pulmonary      Return if symptoms worsen or fail to improve.    TOMASZ Woodruff  01/11/2024      Transcribed from ambient dictation for TOMASZ Woodruff by Margaret Schrader.  01/11/24   11:31 EST    Patient or patient representative verbalized consent to the visit recording.  I have personally performed the services described in this document as transcribed by the above individual, and it is both accurate and complete.

## 2024-08-12 ENCOUNTER — OFFICE VISIT (OUTPATIENT)
Dept: INTERNAL MEDICINE | Facility: CLINIC | Age: 54
End: 2024-08-12
Payer: COMMERCIAL

## 2024-08-12 VITALS
OXYGEN SATURATION: 99 % | HEART RATE: 60 BPM | BODY MASS INDEX: 26.24 KG/M2 | WEIGHT: 198 LBS | SYSTOLIC BLOOD PRESSURE: 142 MMHG | HEIGHT: 73 IN | TEMPERATURE: 97.7 F | DIASTOLIC BLOOD PRESSURE: 82 MMHG

## 2024-08-12 DIAGNOSIS — M72.2 PLANTAR FASCIITIS: Primary | ICD-10-CM

## 2024-08-12 DIAGNOSIS — M79.671 PAIN OF RIGHT HEEL: ICD-10-CM

## 2024-08-12 PROCEDURE — 99214 OFFICE O/P EST MOD 30 MIN: CPT | Performed by: NURSE PRACTITIONER

## 2024-08-12 RX ORDER — NAPROXEN 500 MG/1
TABLET ORAL
Qty: 60 TABLET | Refills: 0 | Status: SHIPPED | OUTPATIENT
Start: 2024-08-12

## 2024-08-12 NOTE — LETTER
August 12, 2024     Patient: Alberto Torres   YOB: 1970   Date of Visit: 8/12/2024       To Whom It May Concern:    It is my medical opinion that Alberto Torres          Sincerely,        TOMASZ Fonseca

## 2024-08-26 ENCOUNTER — OFFICE VISIT (OUTPATIENT)
Dept: INTERNAL MEDICINE | Facility: CLINIC | Age: 54
End: 2024-08-26
Payer: COMMERCIAL

## 2024-08-26 VITALS
SYSTOLIC BLOOD PRESSURE: 132 MMHG | DIASTOLIC BLOOD PRESSURE: 78 MMHG | OXYGEN SATURATION: 100 % | HEART RATE: 72 BPM | BODY MASS INDEX: 27.17 KG/M2 | WEIGHT: 205 LBS | TEMPERATURE: 97.8 F | HEIGHT: 73 IN

## 2024-08-26 DIAGNOSIS — M72.2 PLANTAR FASCIITIS: Primary | ICD-10-CM

## 2024-08-26 DIAGNOSIS — M79.671 PAIN OF RIGHT HEEL: ICD-10-CM

## 2024-08-26 PROCEDURE — 99214 OFFICE O/P EST MOD 30 MIN: CPT | Performed by: NURSE PRACTITIONER

## 2024-08-26 NOTE — PROGRESS NOTES
"Chief Complaint   Patient presents with    Follow-up    Foot Pain     right     Subjective   Alberto Torres is a 54 y.o. male presents to follow-up for right plantar fasciitis.    History of Present Illness  He reports an initial improvement in the first week with wearing boot, icing, elevating and taking naproxen as needed.  He trying to walk 4 miles without his boot on Friday and had significant pain that evening and into the next day.  He contacted his work and told him he was not can to be unable to walk a lot at work.  He works at [employer] and has to walk 6 to 8 miles a day.  They told him not to come in to work until the solution was resolved, they would not allow him to work light duty.  Started wearing boot again and taking the medication and using ice and it has been helpful.  He is considering a cortisone injection with podiatrist.  X-ray was $400 so he did not complete this, but there was no prior injury, fall.  He is currently on FMLA for work.    The following portions of the patient's history were reviewed and updated as appropriate: allergies, current medications, past family history, past medical history, past social history, past surgical history and problem list.    Objective   /78   Pulse 72   Temp 97.8 °F (36.6 °C)   Ht 185.4 cm (72.99\")   Wt 93 kg (205 lb)   SpO2 100%   BMI 27.05 kg/m²   Body mass index is 27.05 kg/m².       Physical Exam  Constitutional:       General: He is not in acute distress.     Appearance: Normal appearance.   HENT:      Head: Normocephalic and atraumatic.   Eyes:      Extraocular Movements: Extraocular movements intact.   Cardiovascular:      Rate and Rhythm: Normal rate.   Pulmonary:      Effort: Pulmonary effort is normal.   Musculoskeletal:         General: Normal range of motion.      Cervical back: Normal range of motion.        Feet:    Feet:      Right foot:      Skin integrity: Skin integrity normal.      Toenail Condition: Right toenails are normal. "   Neurological:      General: No focal deficit present.      Mental Status: He is alert and oriented to person, place, and time.   Psychiatric:         Mood and Affect: Mood normal.         Behavior: Behavior normal.         Thought Content: Thought content normal.         Judgment: Judgment normal.         Assessment & Plan   Alberto Torres is here today and the following problems have been addressed:      Diagnoses and all orders for this visit:    1. Plantar fasciitis (Primary)    2. Pain of right heel      Assessment & Plan  Pain was significantly improving, but he took the boot off and walked 4 miles and symptoms returned.  Advised patient to wear boot, ice for 15-20 minutes at a time, elevate above heart when at rest, take naproxen as needed.  Discussed this may take a few weeks to heal, sometimes up to 6 weeks and if he does not follow instructions the pain will continue to return.  I gave him the number to a podiatrist to contact if he is interested in further evaluation and cortisone injections, the x-rays may be cheaper to obtain through the podiatrist at their office and he should consider x-ray imaging especially if symptoms persist or worsen.  He is currently unable to complete light duty at work due to the amount of walking that is required for his job through [employer] so he is currently on FMLA.  He will follow-up with me in 2 weeks or with the podiatrist if symptoms persist/worsen.    Follow Up   Return in about 2 weeks (around 9/9/2024) for foot.  Patient was given instructions and counseling regarding his condition or for health maintenance advice. Please see specific information pulled into the AVS if appropriate.     Ethel TOLBERT  [facility]      Patient or patient representative verbalized consent for the use of Ambient Listening during the visit with  TOMASZ Fonseca for chart documentation. 8/26/2024  09:25 EDT

## 2024-09-09 ENCOUNTER — OFFICE VISIT (OUTPATIENT)
Dept: INTERNAL MEDICINE | Facility: CLINIC | Age: 54
End: 2024-09-09
Payer: COMMERCIAL

## 2024-09-09 VITALS
HEIGHT: 73 IN | DIASTOLIC BLOOD PRESSURE: 76 MMHG | HEART RATE: 72 BPM | SYSTOLIC BLOOD PRESSURE: 120 MMHG | OXYGEN SATURATION: 97 % | BODY MASS INDEX: 27.83 KG/M2 | TEMPERATURE: 98.2 F | WEIGHT: 210 LBS

## 2024-09-09 DIAGNOSIS — M72.2 PLANTAR FASCIITIS: Primary | ICD-10-CM

## 2024-09-09 PROCEDURE — 99213 OFFICE O/P EST LOW 20 MIN: CPT | Performed by: NURSE PRACTITIONER

## 2024-09-09 NOTE — PROGRESS NOTES
"Chief Complaint   Patient presents with    Follow-up    Plantar Fasciitis     right     Subjective     History of Present Illness  54-year-old male presents for a follow-up of right plantar fasciitis.  Significant improvement as of yesterday and today.  During his last visit 2 weeks ago, he reported an improvement in his condition with the use of naproxen, cam boot, ice, and elevation. However, an attempt to walk 4 miles w/o boot significantly exacerbated his symptoms. He was considering seeing a podiatrist and information was provided to contact and schedule but he did not schedule this as symptoms got better. He did not complete the x-ray due to the $400 out-of-pocket cost. There was no known injury or fall prior to the onset of symptoms. His job at [employer], which primarily involves walking, has prevented him from returning to work even on light duty, he was advised by [employer] not to come in until the issue was resolved. Patient states he walks 6 to 8 miles every day at work. On FMLA.  Ready to return to work tomorrow due to significant improvement.  Needs a note allowing him to return to work without restrictions.    The following portions of the patient's history were reviewed and updated as appropriate: allergies, current medications, past family history, past medical history, past social history, past surgical history and problem list.    Review of Systems   All other systems reviewed and are negative.      Objective   /76   Pulse 72   Temp 98.2 °F (36.8 °C)   Ht 185.4 cm (72.99\")   Wt 95.3 kg (210 lb)   SpO2 97%   BMI 27.71 kg/m²   Body mass index is 27.71 kg/m².       Physical Exam  Constitutional:       General: He is not in acute distress.     Appearance: Normal appearance.   HENT:      Head: Normocephalic and atraumatic.   Eyes:      Extraocular Movements: Extraocular movements intact.   Cardiovascular:      Rate and Rhythm: Normal rate.   Pulmonary:      Effort: Pulmonary effort is normal. "   Musculoskeletal:         General: Normal range of motion.      Cervical back: Normal range of motion.   Neurological:      General: No focal deficit present.      Mental Status: He is alert and oriented to person, place, and time.   Psychiatric:         Mood and Affect: Mood normal.         Behavior: Behavior normal.         Thought Content: Thought content normal.         Judgment: Judgment normal.         Assessment & Plan   Alberto Torres is here today and the following problems have been addressed:      Diagnoses and all orders for this visit:    1. Plantar fasciitis (Primary)      Assessment & Plan  Significant improvement, no issues yesterday or today with his daily activities  Work note provided to allow him to return tomorrow with no restrictions  Discussed preventative measures for plantar fasciitis, he has purchased insoles for his shoes  If recurring symptoms recommend follow-up with podiatry    Follow Up   PRN  Patient was given instructions and counseling regarding his condition or for health maintenance advice. Please see specific information pulled into the AVS if appropriate.     Ethel TOLBERT  [facility]      Patient or patient representative verbalized consent for the use of Ambient Listening during the visit with  TOMASZ Fonseca for chart documentation. 9/9/2024  15:56 EDT

## 2024-09-09 NOTE — LETTER
September 9, 2024     Patient: Alberto Torres   YOB: 1970   Date of Visit: 9/9/2024       To Whom It May Concern:    It is my medical opinion that Alberto Torres may return to full duty immediately with no restrictions tomorrow 9/10/24.    Sincerely,      Ethel Vega, TOMASZ